# Patient Record
Sex: FEMALE | Race: BLACK OR AFRICAN AMERICAN | NOT HISPANIC OR LATINO | Employment: FULL TIME | URBAN - METROPOLITAN AREA
[De-identification: names, ages, dates, MRNs, and addresses within clinical notes are randomized per-mention and may not be internally consistent; named-entity substitution may affect disease eponyms.]

---

## 2020-10-20 ENCOUNTER — APPOINTMENT (OUTPATIENT)
Dept: GENERAL RADIOLOGY | Facility: CLINIC | Age: 25
End: 2020-10-20
Attending: EMERGENCY MEDICINE
Payer: OTHER MISCELLANEOUS

## 2020-10-20 ENCOUNTER — HOSPITAL ENCOUNTER (EMERGENCY)
Facility: CLINIC | Age: 25
Discharge: HOME | End: 2020-10-20
Attending: EMERGENCY MEDICINE | Admitting: EMERGENCY MEDICINE
Payer: OTHER MISCELLANEOUS

## 2020-10-20 VITALS
TEMPERATURE: 97.9 F | WEIGHT: 286 LBS | RESPIRATION RATE: 14 BRPM | SYSTOLIC BLOOD PRESSURE: 130 MMHG | BODY MASS INDEX: 47.65 KG/M2 | DIASTOLIC BLOOD PRESSURE: 80 MMHG | HEART RATE: 86 BPM | OXYGEN SATURATION: 100 % | HEIGHT: 65 IN

## 2020-10-20 DIAGNOSIS — S63.501A SPRAIN OF RIGHT WRIST, INITIAL ENCOUNTER: ICD-10-CM

## 2020-10-20 PROCEDURE — 250N000013 HC RX MED GY IP 250 OP 250 PS 637: Performed by: EMERGENCY MEDICINE

## 2020-10-20 PROCEDURE — 99283 EMERGENCY DEPT VISIT LOW MDM: CPT

## 2020-10-20 PROCEDURE — 73110 X-RAY EXAM OF WRIST: CPT | Mod: RT

## 2020-10-20 RX ORDER — IBUPROFEN 800 MG/1
800 TABLET, FILM COATED ORAL ONCE
Status: COMPLETED | OUTPATIENT
Start: 2020-10-20 | End: 2020-10-20

## 2020-10-20 RX ADMIN — IBUPROFEN 800 MG: 800 TABLET ORAL at 09:12

## 2020-10-20 ASSESSMENT — ENCOUNTER SYMPTOMS
FEVER: 0
DYSURIA: 0
FREQUENCY: 0
VOMITING: 0
ABDOMINAL PAIN: 0
COUGH: 0
ARTHRALGIAS: 1
CHILLS: 0
DIARRHEA: 0
NAUSEA: 0
NUMBNESS: 1

## 2020-10-20 ASSESSMENT — MIFFLIN-ST. JEOR: SCORE: 2033.17

## 2020-10-20 NOTE — ED TRIAGE NOTES
Patient comes in for evaluation of pain in right wrist. 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 ABCs intact.

## 2020-10-20 NOTE — ED AVS SNAPSHOT
[facility] Emergency Dept  [address]  [facility] [address]  Phone: [phone]  Fax: [phone]                                    Maria Alejandra Winters   MRN: [id number]    Department: [facility] Emergency Dept   Date of Visit: 10/20/2020           After Visit Summary Signature Page    I have received my discharge instructions, and my questions have been answered. I have discussed any challenges I see with this plan with the nurse or doctor.    ..........................................................................................................................................  Patient/Patient Representative Signature      ..........................................................................................................................................  Patient Representative Print Name and Relationship to Patient    ..................................................               ................................................  Date                                   Time    ..........................................................................................................................................  Reviewed by Signature/Title    ...................................................              ..............................................  Date                                               Time          22EPIC Rev 08/18

## 2020-10-20 NOTE — LETTER
October 20, 2020      To Whom It May Concern:      Maria Alejandra Winters was seen in our Emergency Department today, 10/20/20.  I expect her condition to improve over the next 7 days.  She may return to work on October 21 but will have limited use of her right hand/arm and no heavy lifting greater than 15 pounds for 7 days.    Sincerely,        Link Chatman MD

## 2020-10-20 NOTE — ED PROVIDER NOTES
"  History     Chief Complaint:  Wrist Pain    HPI   Maria Alejandra Winters is a left handed 25 year old female who presents with wrist pain. The patient reports this morning around 0620 while at work, a patient got physical and twisted her right wrist. She had immediate onset of pain. The pain is constant, aggravated by movement and has not taken anything for her pain. She presents with right handed numbness and pain that radiates from the base of the wrist into the thumb. She denies any other complaints.     Allergies:  No known drug allergies      Medications:    The patient is not currently taking any prescribed medications.     Past Medical History:    The patient does not have any past pertinent medical history.     Past Surgical History:    History reviewed. No pertinent surgical history.     Family History:    History reviewed. No pertinent family history.      Social History:  This patient is brought into the clinic by her mother.      Review of Systems   Constitutional: Negative for chills and fever.   Respiratory: Negative for cough.    Cardiovascular: Negative for chest pain.   Gastrointestinal: Negative for abdominal pain, diarrhea, nausea and vomiting.   Genitourinary: Negative for dysuria, frequency and urgency.   Musculoskeletal: Positive for arthralgias.   Neurological: Positive for numbness.   All other systems reviewed and are negative.    Physical Exam     Patient Vitals for the past 24 hrs:   BP Temp Pulse Resp SpO2 Height Weight   10/20/20 0846 125/81 97.9  F (36.6  C) 98 14 98 % 1.651 m (5' 5\") 129.7 kg (286 lb)     Physical Exam     General:   Pleasant, age appropriate.  EYES:   Conjunctiva normal.  NECK:    Supple, no meningismus.   CV:     Regular rate and rhythm     No murmurs, rubs or gallops.       2+ radial pulses bilateral.  PULM:    Clear to auscultation bilateral.       No respiratory distress.      No wheezing, rales or stridor.  MSK:     Right upper extremity:      No pain with palpation " or ROM of the elbow.      No scaphoid tenderness.      Tenderness to distal radius and ulna.      No overlying laceration or tenting of the skin.  LYMPH:   No cervical lymphadenopathy.  NEURO:   Right upper extremity:      Median, radial and ulnar nerve intact to motor and sensation.  SKIN:    Warm, dry and intact.       No rash.  PSYCH:    Mood is good and affect is appropriate.    Emergency Department Course     Imaging:  Radiology findings were communicated with the patient who voiced understanding of the findings.    XR Wrist, 3 views right  Negative exam.  Reading per radiology    Interventions:  912 ibuprofen 800 mg PO    Emergency Department Course:  Past medical records, nursing notes, and vitals reviewed.    854 I performed an exam of the patient as documented above.     35 The patient was sent for a XR right wrist while in the emergency department, results above.     0943 I rechecked the patient and discussed the results of the ED workup thus far.     Findings and plan explained to the Patient. Patient discharged home with instructions regarding supportive care, medications, and reasons to return. The importance of close follow-up was reviewed.     Impression & Plan       Medical Decision Makin-year-old female presented to the ED with traumatic right wrist pain.  X-rays are without fracture or dislocation.  No tenderness over the scaphoid bone to draw concern for occult scaphoid fracture.  Evaluation is consistent with ligamentous sprain vs contusion.  Patient to use ibuprofen and Tylenol as needed.  Velcro cock-up wrist splint as needed for comfort.  Work note provided.  Follow-up with PCP in 1 week if symptoms not improving.    Diagnosis:    ICD-10-CM   1. Sprain of right wrist, initial encounter  S63.501A     Disposition:  Discharged to home.    Scribe Disclosure:  Osiris PAYTON, am serving as a scribe at 8:57 AM on 10/20/2020 to document services personally performed by Link Chatman  MD PRASANNA based on my observations and the provider's statements to me.       Link Chatman MD  10/20/20 0929

## 2021-02-23 ENCOUNTER — HOSPITAL ENCOUNTER (EMERGENCY)
Facility: CLINIC | Age: 26
Discharge: HOME | End: 2021-02-23
Attending: EMERGENCY MEDICINE | Admitting: EMERGENCY MEDICINE
Payer: MEDICAID

## 2021-02-23 VITALS
RESPIRATION RATE: 18 BRPM | HEART RATE: 101 BPM | SYSTOLIC BLOOD PRESSURE: 135 MMHG | OXYGEN SATURATION: 100 % | BODY MASS INDEX: 47.48 KG/M2 | TEMPERATURE: 98.2 F | WEIGHT: 285 LBS | HEIGHT: 65 IN | DIASTOLIC BLOOD PRESSURE: 91 MMHG

## 2021-02-23 DIAGNOSIS — J02.9 ACUTE PHARYNGITIS, UNSPECIFIED ETIOLOGY: ICD-10-CM

## 2021-02-23 LAB
DEPRECATED S PYO AG THROAT QL EIA: NEGATIVE
FLUAV RNA RESP QL NAA+PROBE: NEGATIVE
FLUBV RNA RESP QL NAA+PROBE: NEGATIVE
LABORATORY COMMENT REPORT: NORMAL
RSV RNA SPEC QL NAA+PROBE: NORMAL
SARS-COV-2 RNA RESP QL NAA+PROBE: NEGATIVE
SPECIMEN SOURCE: NORMAL
SPECIMEN SOURCE: NORMAL

## 2021-02-23 PROCEDURE — 999N001174 HC STATISTIC STREP A RAPID: Performed by: EMERGENCY MEDICINE

## 2021-02-23 PROCEDURE — 250N000013 HC RX MED GY IP 250 OP 250 PS 637: Performed by: EMERGENCY MEDICINE

## 2021-02-23 PROCEDURE — 87636 SARSCOV2 & INF A&B AMP PRB: CPT | Performed by: EMERGENCY MEDICINE

## 2021-02-23 PROCEDURE — 99283 EMERGENCY DEPT VISIT LOW MDM: CPT

## 2021-02-23 PROCEDURE — 87651 STREP A DNA AMP PROBE: CPT | Performed by: EMERGENCY MEDICINE

## 2021-02-23 PROCEDURE — 250N000012 HC RX MED GY IP 250 OP 636 PS 637: Performed by: EMERGENCY MEDICINE

## 2021-02-23 PROCEDURE — C9803 HOPD COVID-19 SPEC COLLECT: HCPCS

## 2021-02-23 RX ORDER — IBUPROFEN 600 MG/1
600 TABLET, FILM COATED ORAL ONCE
Status: COMPLETED | OUTPATIENT
Start: 2021-02-23 | End: 2021-02-23

## 2021-02-23 RX ADMIN — DEXAMETHASONE 10 MG: 2 TABLET ORAL at 21:31

## 2021-02-23 RX ADMIN — IBUPROFEN 600 MG: 600 TABLET, FILM COATED ORAL at 21:30

## 2021-02-23 ASSESSMENT — MIFFLIN-ST. JEOR: SCORE: 2028.63

## 2021-02-23 ASSESSMENT — ENCOUNTER SYMPTOMS
TROUBLE SWALLOWING: 1
COUGH: 1
GASTROINTESTINAL NEGATIVE: 1
VOMITING: 0
SORE THROAT: 1
CONSTITUTIONAL NEGATIVE: 1
DIARRHEA: 0

## 2021-02-24 LAB
SPECIMEN SOURCE: NORMAL
STREP GROUP A PCR: NOT DETECTED

## 2021-02-24 NOTE — ED TRIAGE NOTES
Here for sore throat with pain radiating to right side of face. Feels like throat is swelling and having trouble/pain with swallowing. Coughing for few weeks. Had negative covid about 1 week ago. Took oxycodone about 30 min prior to arrival. ABCs intact.

## 2021-02-24 NOTE — RESULT ENCOUNTER NOTE
Group A Streptococcus PCR is NEGATIVE  No treatment or change in treatment [facility] ED lab result protocol - Strep protocol.

## 2021-02-24 NOTE — ED PROVIDER NOTES
"  History   Chief Complaint:  Pharyngitis       HPI   Maria Alejandra Winters is a 25 year old female with history of asthma who presents with sore throat starting this morning more on the right side and radiating into her right ear.  Took 1 tablet of oxycodone she had leftover from a previous prescription but this did not help with her pain so she presented the emergency department due to concerns for strep throat.  She denies fever or chills.  She notes that since taking the oxycodone she feels more able to swallow without difficulty.  She notes she has has a chronic cough that is not new for her and smokes marijuana but not cigarettes.  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  She denies any abdominal pain, nausea or vomiting or diarrhea.    Review of Systems   Constitutional: Negative.    HENT: Positive for ear pain, sore throat and trouble swallowing (improved since pain meds).    Respiratory: Positive for cough (chronic).    Cardiovascular: Negative for chest pain.   Gastrointestinal: Negative.  Negative for diarrhea and vomiting.   All other systems reviewed and are negative.        Allergies:  The patient has no known allergies.     Medications:  Oxycodone    Albuterol     Past Medical History:    Asthma      Past Surgical History:    Bronchoscopy with lavage     Family History:    Hypertension- father     Social History:  Smokes marijuana.  Denies cigarette smoking.  Denies regular alcohol use.  Here alone.    Physical Exam     Patient Vitals for the past 24 hrs:   BP Temp Temp src Pulse Resp SpO2 Height Weight   02/23/21 2109 (!) 135/91 98.2  F (36.8  C) Oral 101 18 100 % 1.651 m (5' 5\") 129.3 kg (285 lb)       Physical Exam  Gen: Not female sitting upright  Eyes: PERRL, no scleral icterus, conjunctiva normal  ENT: Moist mucous membranes. Posterior oropharynx erythematous but is without exudate, lesions or significant edema.  No pooling of secretions.Uvula is midline. No asymmetry.  Speech is " normal.  Neck: No lymphadenopathy  CV: Normal S1S2. Regular rate and rhythm. No murmurs, rubs or gallops.  Resp: Clear to auscultation bilaterally. Normal respiratory effort. No wheezes, rales or rhonchi.  GI: Abdomen is soft, nontender and nondistended. No hepatosplenolegaly or palpable masses.   MSK: No edema. Nontender. Normal active range of motion. Ambulatory.  Skin: Warm and dry. No rashes, petechiae or ecchymoses.  Neuro: Alert and appropriate. Normal speech. Responds appropriately to all questions and commands. No focal abnormalities appreciated.  Psych: Normal affect. Pleasant.     Emergency Department Course     Laboratory:  Rapid Strep Test: negative   Strep Culture: Pending    Influenza A/B antigen: negative     COVID-19 by PCR: negative     Emergency Department Course:    Reviewed:  I reviewed nursing notes, vitals, past medical history and care everywhere    Assessments:  2113 I obtained history and examined the patient as noted above.   2205 I rechecked the patient and explained findings prior to discharge.  She is feeling improved.    Consults:   NA    Interventions:  2130 Advil 600 mg oral   2131 Decadron 10 mg oral     Disposition:  The patient was discharged to home.     Impression & Plan       CMS Diagnoses: None    Medical Decision Making:  Maria Alejandra Winters is a 25 year old female who presents with sore throat with negative rapid strep test.  DDX includes: viral pharyngitis, group A streptococci, infectious mononucleosis, gonorrhea, influenza, COVID-19, peritonsillar abscess, retropharyngeal abscess, epiglottitis.  In this patient who is generally non-toxic, strep negative with no intra-oral lesions, no uvular swelling, no addison-tonsillar or retropharyngeal swelling, no drooling, no signs of candidiasis, no stridor, or problems controlling their secretions the most likely diagnosis is viral pharyngitis and symptomatic relief is required.  I discussed in detail the possibility that there could be a  more dangerous infection and that follow-up was required if symptoms persist.  She understands to follow-up within 3 to 5 days with ongoing symptoms.  Return to the emergency department for worsening symptoms.  All questions were answered prior to discharge.     Covid-19  Maria Alejandra Winters was evaluated during a global COVID-19 pandemic, which necessitated consideration that the patient might be at risk for infection with the SARS-CoV-2 virus that causes COVID-19.   Applicable protocols for evaluation were followed during the patient's care.   COVID-19 was considered as part of the patient's evaluation. The plan for testing is:  a test was obtained during this visit.    Diagnosis:    ICD-10-CM    1. Acute pharyngitis, unspecified etiology  J02.9 Symptomatic Influenza A/B & SARS-CoV2 (COVID-19) Virus PCR Multiplex     Scribe Disclosure:  I, Yvonne Amos, am serving as a scribe at 9:25 PM on 2/23/2021 to document services personally performed by Aleksandra Garcia MD based on my observations and the provider's statements to me.              Aleksandra Garcia MD  02/23/21 0805

## 2021-07-02 ENCOUNTER — HOSPITAL ENCOUNTER (EMERGENCY)
Facility: CLINIC | Age: 26
Discharge: HOME | End: 2021-07-02
Attending: PHYSICIAN ASSISTANT | Admitting: PHYSICIAN ASSISTANT
Payer: MEDICAID

## 2021-07-02 VITALS
RESPIRATION RATE: 15 BRPM | OXYGEN SATURATION: 100 % | DIASTOLIC BLOOD PRESSURE: 92 MMHG | BODY MASS INDEX: 49.67 KG/M2 | TEMPERATURE: 98.4 F | SYSTOLIC BLOOD PRESSURE: 172 MMHG | WEIGHT: 293 LBS | HEART RATE: 106 BPM

## 2021-07-02 DIAGNOSIS — L08.9 SKIN INFECTION: ICD-10-CM

## 2021-07-02 PROCEDURE — 99282 EMERGENCY DEPT VISIT SF MDM: CPT

## 2021-07-02 RX ORDER — SULFAMETHOXAZOLE/TRIMETHOPRIM 800-160 MG
1 TABLET ORAL 2 TIMES DAILY
Qty: 14 TABLET | Refills: 0 | Status: SHIPPED | OUTPATIENT
Start: 2021-07-02 | End: 2021-07-09

## 2021-07-02 ASSESSMENT — ENCOUNTER SYMPTOMS
FEVER: 0
CHILLS: 0

## 2021-07-02 NOTE — ED TRIAGE NOTES
Pt describes having a lump on her back that is getting larger and increasingly painful.  First noticed the lump three days ago.  No drainage.

## 2021-07-02 NOTE — LETTER
July 2, 2021      To Whom It May Concern:      Maria Alejandra Winters was seen in our Emergency Department today, 07/02/21. Please excuse her from work 7/2/21-7/3/21. She may return to work 7/4/21.     Sincerely,        Yuliet Burrell PA-C

## 2021-07-02 NOTE — ED PROVIDER NOTES
History   Chief Complaint:  Wound Check       The history is provided by the patient.      Maria Alejandra Winters is a 26 year old female who presents with a painful bump on the right side of her back lateral to the midline. She reports that it is painful and tender. She noticed it starting a couple days ago. She reports pain is exacerbated with palpation and becomes more irritated when lifting as her shirt rubs against it.  The patient denies fever and chills.  Notes that she had a pimple to the area prior to symptom onset.  At the time of the exam, the patient denied chest pain, shortness of breath, nausea, vomiting, diarrhea, abdominal pain, back pain, numbness or tingling in her extremities, or any other medical concerns.    Review of Systems   Constitutional: Negative for chills and fever.   Skin:        (+) painful bump on back   All other systems reviewed and are negative.    Allergies:  No Known Drug Allergies    Medications:  Oxycodone  Zofran  Zithromax  abuterol inhaler  Pepcid  Provera  Tessalon    Past Medical History:    Gross hemauria     Social History:  Presents to the ED accompanied by her sister.   Employed.     Physical Exam     Patient Vitals for the past 24 hrs:   BP Temp Temp src Pulse Resp SpO2 Weight   07/02/21 0939 (!) 160/97 98.4  F (36.9  C) Oral 106 16 100 % 135.4 kg (298 lb 8.1 oz)       Physical Exam  Vitals signs and nursing note reviewed.   HENT:      Nose: Nose normal. No congestion or rhinorrhea.      Mouth/Throat:      Mouth: Mucous membranes are moist.      Pharynx: Oropharynx is clear. No oropharyngeal exudate or posterior oropharyngeal erythema.   Eyes:      General: No scleral icterus.     Extraocular Movements: Extraocular movements intact.   Cardiovascular:      Rate and Rhythm: Regular rhythm. Normal Rate.     Pulses: Normal pulses.      Heart sounds: Normal heart sounds.   Pulmonary:      Effort: Pulmonary effort is normal.      Breath sounds: Normal breath sounds.   Abdominal:       General: Abdomen is flat. Bowel sounds are normal.      Palpations: Abdomen is soft.      Tenderness: There is no abdominal tenderness.   Musculoskeletal: Normal range of motion lateral upper and lower extremities.  Patient able to flex and extend back without difficulty.  No normal range of motion of neck.  No cervical, thoracic, or lumbar midline bony tenderness.  Skin:     General: Skin is warm and dry.  Palpable quarter-sized indurated area to right side of back with what appears to be a previously popped pimple in the center.  No fluctuance or area amendable to incision and drainage.  Mild warmth surrounding the area.   Neurological:      Mental Status: Alert. Speech normal. Responds appropriately to questions.   Psychiatric:         Mood and Affect: Mood normal.         Behavior: Behavior normal.       Emergency Department Course     Emergency Department Course:    Reviewed:  I reviewed nursing notes, vitals, past medical history and care everywhere    Assessments:  1006 I obtained history and examined the patient as noted above.  We discussed the plan for discharge home with antibiotic therapy and close follow-up with her primary care provider for wound recheck.  Patient was discharged home in stable condition.    Disposition:  The patient was discharged to home.       Impression & Plan   Medical Decision Making:   Maria Alejandra Winters on 6-year-old female who presents to the emergency department accompanied by her sister for the evaluation of a painful bump to the right side of her back lateral to the midline.  Vitals are reviewed.  Patient afebrile and hemodynamically stable.  On physical exam, there was a palpable quarter-sized indurated area to right side of back with what appears to be a previously popped pimple in the center.  No fluctuance or area amendable to incision and drainage.  Mild warmth surrounding the area concerning for skin infection with possibly early abscess formation.  At this time, there  is no evidence to suggest complications including but not limited to sepsis, shock, lymphangitis, lymphadenitis, abscess, etc. Labs discussed, patient deferred.  The patient is not immunosuppressed or diabetic.  Given clinical findings, I elected to discharge the patient home with a course of antibiotic therapy to complete in the outpatient setting.  She was advised to follow-up with her primary care provider in 2 to 3 days for wound recheck. Area of involvement was outlined prior to discharge.  Strict return precautions were discussed including but not limited to high fevers, spreading redness, increased pain, inability to tolerate oral antibiotics, or any other medical concerns.  All questions and concerns were addressed prior to discharge.  The patient understands and agrees to this plan.      Diagnosis:    ICD-10-CM    1. Skin infection  L08.9        Discharge Medications:  New Prescriptions    SULFAMETHOXAZOLE-TRIMETHOPRIM (BACTRIM DS) 800-160 MG TABLET    Take 1 tablet by mouth 2 times daily for 7 days       Scribe Disclosure:  IAyanna, am serving as a scribe at 10:01 AM on 7/2/2021 to document services personally performed by Yuliet Burrell PA-C based on my observations and the provider's statements to me.          Yuliet Burrell PA-C  07/02/21 1038       Yuliet Burrell PA-C  07/02/21 1039       Yuliet Burrell PA-C  07/02/21 1141

## 2021-07-02 NOTE — DISCHARGE INSTRUCTIONS
As discussed, I suspect her symptoms are secondary to a skin infection with possible early abscess formation.  Please begin antibiotic therapy today and complete the full duration.  Follow-up with your primary care provider in 2 to 3 days for wound check.  Return to the emergency department if you develop fever, increased size of the involved area, or any other medical concerns.

## 2021-10-25 ENCOUNTER — APPOINTMENT (OUTPATIENT)
Dept: GENERAL RADIOLOGY | Facility: CLINIC | Age: 26
End: 2021-10-25
Attending: PHYSICIAN ASSISTANT
Payer: MEDICAID

## 2021-10-25 ENCOUNTER — HOSPITAL ENCOUNTER (EMERGENCY)
Facility: CLINIC | Age: 26
Discharge: HOME | End: 2021-10-25
Attending: PHYSICIAN ASSISTANT | Admitting: PHYSICIAN ASSISTANT
Payer: MEDICAID

## 2021-10-25 VITALS
SYSTOLIC BLOOD PRESSURE: 154 MMHG | RESPIRATION RATE: 18 BRPM | HEART RATE: 96 BPM | OXYGEN SATURATION: 100 % | DIASTOLIC BLOOD PRESSURE: 114 MMHG | TEMPERATURE: 98.2 F

## 2021-10-25 DIAGNOSIS — M79.2 RADICULAR PAIN IN LEFT ARM: ICD-10-CM

## 2021-10-25 DIAGNOSIS — M25.512 ACUTE PAIN OF LEFT SHOULDER: ICD-10-CM

## 2021-10-25 PROCEDURE — 99284 EMERGENCY DEPT VISIT MOD MDM: CPT

## 2021-10-25 PROCEDURE — 73030 X-RAY EXAM OF SHOULDER: CPT | Mod: LT

## 2021-10-25 RX ORDER — LIDOCAINE 50 MG/G
1 PATCH TOPICAL EVERY 24 HOURS
Qty: 10 PATCH | Refills: 0 | Status: SHIPPED | OUTPATIENT
Start: 2021-10-25 | End: 2021-11-04

## 2021-10-25 RX ORDER — METHOCARBAMOL 750 MG/1
750 TABLET, FILM COATED ORAL 3 TIMES DAILY PRN
Qty: 15 TABLET | Refills: 0 | Status: SHIPPED | OUTPATIENT
Start: 2021-10-25 | End: 2021-10-30

## 2021-10-25 RX ORDER — METHYLPREDNISOLONE 4 MG
TABLET, DOSE PACK ORAL
Qty: 21 TABLET | Refills: 0 | Status: SHIPPED | OUTPATIENT
Start: 2021-10-25 | End: 2022-12-05

## 2021-10-25 ASSESSMENT — ENCOUNTER SYMPTOMS
WEAKNESS: 1
NUMBNESS: 1
NECK PAIN: 0

## 2021-10-25 NOTE — LETTER
October 25, 2021      To Whom It May Concern:      Maria Alejandra Winters was seen in our Emergency Department today, 10/25/21.  I expect her condition to improve over the next 1-2 days.  She may return to work/school when improved.    Sincerely,        Alee Triana PA-C

## 2021-10-25 NOTE — ED PROVIDER NOTES
"  History   Chief Complaint:  Arm Pain     The history is provided by the patient.      Maria Alejandra Winters is a 26 year old female who presents with arm pain. 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 Since then, she is still feeling the numbness that she describes as an \"arm heaviness\" but is also having pain from certain movements in her arm, tingling in her fingers and occasional weakness to the point where she is dropping her phone out of her hand. The patient denies neck pain, chest pain or history of a blood clot.     To note, the patient did not feel a pull or pop when she was rolling the patient.     Review of Systems   Cardiovascular: Negative for chest pain.   Musculoskeletal: Negative for neck pain.   Neurological: Positive for weakness (Hand) and numbness (And tingling ).   All other systems reviewed and are negative.    Allergies:  The patient has no known allergies.     Medications:  Zofran  Zithromax  Abuterol inhaler  Pepcid  Provera  Tessalon    Past Medical History:     Gross hematuria  Ashma      Past Surgical History:    The patient denies past surgical history.      Family History:    The patient denies past family history.     Social History:  The patient presents to the ED alone   He patient works in Health Care    Physical Exam     Patient Vitals for the past 24 hrs:   BP Temp Pulse Resp SpO2   10/25/21 0935 (!) 154/114 98.2  F (36.8  C) 96 18 100 %     Physical Exam  General: Alert and interactive. Appears well.   Head: Atraumatic, without obvious lesion, abrasion, hematoma.   Eyes: The pupils are equal and round. No scleral icterus.   ENT: No obvious abnormalities to the ears or nose. Mucous membranes moist.   Neck:Trachea is in the midline. No obvious swelling to the neck. Full range of motion.   CV: Regular rate. Extremities well perfused. Capillary refill brisk in fingers. Radial " "pulse is normal.   Resp: Non-labored, no retractions or accessory muscle use.     GI: Abdomen is not distended.   MS: Moving all extremities well. There is focal pinpoint tenderness to the left posterior shoulder, just lateral to the spine of the scapula. When palpating this, the patient has reproducible \"heaviness\" in the arm and shooting pains into the elbow. Otherwise, her strength is preserved. She describes pain int he posterior shoulder with resisted flexion, but bicep/tricep strength, deltoid strength, and hand grasp are maintained. Full sensation in fingers.  Patient can perform okay sign, peace sign, and cross fingers.  She can flex and extend at the wrist without any difficulty.  Skin: No rash.   Neuro: Alert and oriented x 3. Non-focal examination.    Psych: Awake. Alert.  Normal affect. Appropriate interactions.    Emergency Department Course     Imaging:    XR Shoulder Left G/E 3 Views   Final Result   IMPRESSION: Possible small subchondral cyst in the distal head of the   clavicle. Otherwise unremarkable shoulder radiographs.      JEFF CARTER MD            SYSTEM ID:  [id number]        Report per radiology    Emergency Department Course:  Reviewed:  I reviewed nursing notes, vitals, past medical history, Care Everywhere and [facility]    Assessments:  1103 I obtained history and examined the patient as noted above.   1208 I rechecked the patient and explained findings.     Disposition:  The patient was discharged to home.     Impression & Plan     Medical Decision Making:  Maria Alejandra Winters is a 26 year old female who presents for evaluation of left arm pain and heaviness after moving a patient at her workplace a couple of days ago.  She has trouble describing her symptoms, but it sounds like the patient has radicular pain, as she occasionally has heaviness, occasionally has shooting pains, and also feels some numbness and tingling in the tips of her fingers.     On examination, the patient has full " sensation to the tips of her fingers and full strength without any focal weakness with bicep, tricep, deltoid, and hand grasp testing. There is no focal neurologic deficit. The patient has no weakness in her leg, no vision deficits, no facial weakness, speech difficulty, or balance issues. It does not seem consistent with acute CVA. Also, on examination, the patient has focal pinpoint, and reproducible tenderness in the posterior shoulder just lateral to the spine of the scapula. When palpating this particular spot, the patient has reproducible heaviness in her arm and some radicular shooting pains. X-ray of the shoulder shows a possible subchondral cyst with no signs of fracture, dislocation, or significant arthritis. The patient has no chest pain, and with reproducible pain in the arm and in the posterior shoulder, I doubt ACS.    I suspect this is all radicular pain related to nerve impingement at the level of the shoulder.  It is possible patient also has weakness in her arm muscles from a rotator cuff injury or other muscle tear from moving the patient. She has no focal weakness on examination, and I do not think emergent MRI of the cervical spine or shoulder are indicated at this time. She has no fevers, chills, chest pain, shortness of breath, weakness, or other concerning findings. I recommended that she follow closely with primary care, and in the meantime I will treat her with lidocaine patches, Robaxin, and Medrol Dosepak. Patient return here immediately for focal weakness, fevers or chills, other worrisome concerns.    Diagnosis:    ICD-10-CM    1. Acute pain of left shoulder  M25.512    2. Radicular pain in left arm  M79.2      Discharge Medications:  New Prescriptions    LIDOCAINE (LIDODERM) 5 % PATCH    Place 1 patch onto the skin every 24 hours for 10 days    METHOCARBAMOL (ROBAXIN) 750 MG TABLET    Take 1 tablet (750 mg) by mouth 3 times daily as needed for muscle spasms    METHYLPREDNISOLONE  (MEDROL DOSEPAK) 4 MG TABLET THERAPY PACK    Follow Package Directions     Scribe Disclosure:  I, Tomym Angulo, am serving as a scribe at 10:48 AM on 10/25/2021 to document services personally performed by Alee Triana PA-C, based on my observations and the provider's statements to me.            Alee Triana PA-C  10/25/21 4999

## 2021-10-25 NOTE — DISCHARGE INSTRUCTIONS
Try Lidocaine patches over the shoulder.   Use Robaxin for muscle relaxant.   Try Medrol Dosepak.   Rest as much as possible and ice regularly.   See primary care for follow up.

## 2021-10-25 NOTE — ED TRIAGE NOTES
Patient presents to the ED with left arm pain and tingling. 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

## 2022-01-17 ENCOUNTER — HOSPITAL ENCOUNTER (EMERGENCY)
Facility: CLINIC | Age: 27
Discharge: HOME | End: 2022-01-17
Attending: EMERGENCY MEDICINE | Admitting: EMERGENCY MEDICINE
Payer: MEDICAID

## 2022-01-17 VITALS
OXYGEN SATURATION: 100 % | DIASTOLIC BLOOD PRESSURE: 103 MMHG | TEMPERATURE: 100.4 F | HEART RATE: 106 BPM | SYSTOLIC BLOOD PRESSURE: 139 MMHG | RESPIRATION RATE: 20 BRPM

## 2022-01-17 DIAGNOSIS — K52.9 GASTROENTERITIS: ICD-10-CM

## 2022-01-17 LAB
FLUAV RNA SPEC QL NAA+PROBE: NEGATIVE
FLUBV RNA RESP QL NAA+PROBE: NEGATIVE
SARS-COV-2 RNA RESP QL NAA+PROBE: NEGATIVE

## 2022-01-17 PROCEDURE — 99283 EMERGENCY DEPT VISIT LOW MDM: CPT

## 2022-01-17 PROCEDURE — C9803 HOPD COVID-19 SPEC COLLECT: HCPCS

## 2022-01-17 PROCEDURE — 87636 SARSCOV2 & INF A&B AMP PRB: CPT | Performed by: EMERGENCY MEDICINE

## 2022-01-17 ASSESSMENT — ENCOUNTER SYMPTOMS
BLOOD IN STOOL: 0
CHILLS: 1
RESPIRATORY NEGATIVE: 1
VOMITING: 1
DIARRHEA: 1
NAUSEA: 1
FEVER: 1

## 2022-01-17 NOTE — ED PROVIDER NOTES
"  History     Chief Complaint:  Diarrhea, Abdominal Pain, and Headache      HPI   Maria Alejandra Winters is a 26 year old female who presents with complaints of acute nausea with an isolated episode of vomiting yesterday, diarrhea, fever and headache.  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  The nausea is resolved and she is tolerating p.o.    Review of Systems   Constitutional: Positive for chills and fever.   Respiratory: Negative.    Gastrointestinal: Positive for diarrhea, nausea and vomiting. Negative for blood in stool.   All other systems reviewed and are negative.    Allergies:      No Known Allergies      Medications:      methylPREDNISolone (MEDROL DOSEPAK) 4 MG tablet therapy pack        Past Medical History:    asthma        Social History:  Employed as CNA    Physical Exam     No data found.    Physical Exam  General: Patient is alert and cooperative.  HENT:  Normal appearance.   Eyes: EOMI. Normal conjunctiva.  Neck:  Normal range of motion and appearance.   Cardiovascular:  Normal rate.   Pulmonary/Chest:  Effort normal.  Abdominal: Soft. No distension or tenderness.     Musculoskeletal: Normal range of motion. No edema or tenderness.   Neurological: oriented, normal strength, sensation, and coordination.   Skin: Warm and dry. No rash or bruising.   Psychiatric: Normal mood and affect. Normal behavior and judgement.      Emergency Department Course     Laboratory:  Labs Ordered and Resulted from Time of ED Arrival to Time of ED Departure   INFLUENZA A/B & SARS-COV2 PCR MULTIPLEX - Normal       Result Value    Influenza A PCR Negative      Influenza B PCR Negative      SARS CoV2 PCR Negative       Reviewed:    I reviewed nursing notes, vitals and past history    Assessments:   I obtained history and examined the patient as noted above.    I rechecked the patient and explained findings.     Disposition:  The patient was discharged to " home.    Impression & Plan    Medical Decision Making:  Maria Alejandra Winters is a 26 year old female who presents with acute nausea, vomiting and diarrhea. She has a benign abdominal exam without focal RLQ or LLQ tenderness to suggest diverticulitis or appendicitis, respectively, or other acute abdominal process. I did discuss the sometimes vague initial constellation of symptoms early in the course of acute abdominal processes, and the need for immediate return should pain or other concerning symptoms develop.  Symptoms are improved after IV fluids and symptomatic treatment.  There has been no travel or antibiotic exposure to suggest more concerning cause of diarrhea, and there has been no hematemesis or BRBPR/melena.  Vital signs have remained normal and stable throughout the ED course, and the abdominal re-exam remains benign. I believe she is safe for discharge in improved condition at this time with strict return precautions for recurrent vomiting, pain, fever or any other concerning symptoms.    covid neg.      Covid-19  Maria Alejandra Winters was evaluated during a global COVID-19 pandemic, which necessitated consideration that the patient might be at risk for infection with the SARS-CoV-2 virus that causes COVID-19.   Applicable protocols for evaluation were followed during the patient's care.   COVID-19 was considered as part of the patient's evaluation. The plan for testing is:  a test was obtained during this visit.    Diagnosis:    ICD-10-CM    1. Gastroenteritis  K52.9                 Deonte Parmar MD  01/18/22 1542

## 2022-01-17 NOTE — ED TRIAGE NOTES
Arrives with 3 days of diarrhea, headache, and generalized body aches, exposed to COVID at work, alert and oriented, ABCs intact.

## 2022-01-17 NOTE — Clinical Note
Maria Alejandra Winters was seen and treated in our emergency department on 1/17/2022.  She may return to work on 01/21/2022.       If you have any questions or concerns, please don't hesitate to call.      Deonte Parmar MD

## 2022-01-17 NOTE — DISCHARGE INSTRUCTIONS
Discharge Instructions  Gastroenteritis    You have been seen today for vomiting (throwing up) and diarrhea (loose stools), called gastroenteritis or the stomach flu. This is usually caused by a virus, but some bacteria, parasites, medicines or other medical conditions can cause similar symptoms. At this time your provider does not find that your vomiting and diarrhea is a sign of anything dangerous or life-threatening.  However, sometimes the signs of serious illness do not show up right away. Remember that serious problems like appendicitis can look like gastroenteritis at first.       Generally, every Emergency Department visit should have a follow-up clinic visit with either a primary or a specialty clinic/provider. Please follow-up as instructed by your emergency provider today.    Return to the Emergency Department if:  You keep vomiting and you are not able to keep liquids down.  You feel you are getting dehydrated, such as being very thirsty, not urinating (peeing), or feeling faint or lightheaded.   You develop a new fever.  You have abdominal (belly) pain that seems worse than cramps, is in one spot, or is getting worse over time.   You have blood in your vomit or in your diarrhea.  You feel very weak.    What can I do to help myself?  The most important thing to do is to drink clear liquids.  If you have been vomiting a lot, it is best to have only small, frequent sips of liquids.  Drinking too much at once may cause more vomiting. Water is a good first option for rehydration. If you are vomiting often, you must also replace electrolytes (salts and minerals) lost with your illness. Pedialyte  is the best rehydration liquid but many don t like the taste so sports drinks (like Gatorade ) are a good option. Sodas and juice are also options but are high in sugar. Avoid acid liquids (orange), caffeine (coffee) or alcohol. Do not drink milk until you no longer have diarrhea.  After liquids are staying down, you  may start eating mild foods. Soda crackers, toast, plain noodles, gelatin, applesauce and bananas are good first choices.  Avoid foods that have acid, are spicy, fatty or fibrous (such as meats, coarse grains, vegetables). You may start eating these foods again in about 3 days when you are better.  Sometimes treatment includes prescription medicine to prevent nausea (sick to your stomach) and vomiting and to prevent diarrhea. If your provider prescribes these for you, take them as directed.  Nonprescription medicine is available for the treatment of diarrhea and can be very effective.  If you use it, make sure you use the dose recommended on the package. Avoid Lomotil . Check with your healthcare provider before you use any medicine for diarrhea.  Do not take ibuprofen, or other nonsteroidal anti-inflammatory medicines without checking with your healthcare provider.  If you were given a prescription for medicine here today, be sure to read all of the information (including the package insert) that comes with your prescription.  This will include important information about the medicine, its side effects, and any warnings that you need to know about.  The pharmacist who fills the prescription can provide more information and answer questions you may have about the medicine.  If you have questions or concerns that the pharmacist cannot address, please call or return to the Emergency Department.   Remember that you can always come back to the Emergency Department if you are not able to see your regular provider in the amount of time listed above, if you get any new symptoms, or if there is anything that worries you.  Discharge Instructions  Fever    You have been seen today for a fever. Fever is a normal body reaction to illness or inflammation. Fever is a sign that your body is doing what it should to fight something off. Fever is not dangerous, but it can make you feel miserable, and you will probably feel better if  you get your fever to go down. Most infections are caused by a virus, and antibiotics will not help; your provider will tell you whether antibiotics are needed in your case. At this time your provider does not find that your fever is a sign of anything dangerous or life-threatening.  However, sometimes the signs of serious illness do not show up right away so additional care may be necessary.    Generally, every Emergency Department visit should have a follow-up clinic visit with either a primary or a specialty clinic/provider. Please follow-up as instructed by your emergency provider today.    What can I do to help myself?  Fill any prescriptions the provider gave you and take them right away--especially antibiotics.  If you have a fever, get plenty of rest and drink lots of fluids, especially water.  What clothes or blankets you have on will not change your fever. Do what is comfortable for you.  Bathing or sponging in lukewarm water may help you feel better.  Tylenol  (acetaminophen), Motrin  (ibuprofen), or Advil  (ibuprofen) help bring fever down and may help you feel more comfortable. Be sure to read and follow the package directions, and ask your provider if you have questions.  Do not drink alcohol.    Return to the Emergency Department if:  Any of the symptoms you have get much worse.  You seem very sick, like being too weak to get up.  You have any new symptoms, especially serious things like abdominal (belly) pain or chest pain.  You are short of breath.  You have a severe headache.  You are vomiting (throwing up) so much you cannot keep fluids or medicines down.  You have confusion or seem unusually drowsy.  You have a seizure.  You have anything else that worries you.  If you were given a prescription for medicine here today, be sure to read all of the information (including the package insert) that comes with your prescription.  This will include important information about the medicine, its side  effects, and any warnings that you need to know about.  The pharmacist who fills the prescription can provide more information and answer questions you may have about the medicine.  If you have questions or concerns that the pharmacist cannot address, please call or return to the Emergency Department.   Remember that you can always come back to the Emergency Department if you are not able to see your regular provider in the amount of time listed above, if you get any new symptoms, or if there is anything that worries you.

## 2022-03-18 ENCOUNTER — HOSPITAL ENCOUNTER (EMERGENCY)
Facility: CLINIC | Age: 27
Discharge: HOME | End: 2022-03-18
Attending: NURSE PRACTITIONER | Admitting: NURSE PRACTITIONER

## 2022-03-18 VITALS
TEMPERATURE: 98.7 F | RESPIRATION RATE: 20 BRPM | SYSTOLIC BLOOD PRESSURE: 114 MMHG | HEART RATE: 88 BPM | DIASTOLIC BLOOD PRESSURE: 78 MMHG | OXYGEN SATURATION: 100 %

## 2022-03-18 DIAGNOSIS — M62.830 BACK MUSCLE SPASM: ICD-10-CM

## 2022-03-18 DIAGNOSIS — M54.50 LOW BACK PAIN: ICD-10-CM

## 2022-03-18 PROCEDURE — 99284 EMERGENCY DEPT VISIT MOD MDM: CPT

## 2022-03-18 PROCEDURE — 96372 THER/PROPH/DIAG INJ SC/IM: CPT | Performed by: NURSE PRACTITIONER

## 2022-03-18 PROCEDURE — 250N000011 HC RX IP 250 OP 636: Performed by: NURSE PRACTITIONER

## 2022-03-18 PROCEDURE — 250N000013 HC RX MED GY IP 250 OP 250 PS 637: Performed by: NURSE PRACTITIONER

## 2022-03-18 RX ORDER — KETOROLAC TROMETHAMINE 30 MG/ML
30 INJECTION, SOLUTION INTRAMUSCULAR; INTRAVENOUS ONCE
Status: COMPLETED | OUTPATIENT
Start: 2022-03-18 | End: 2022-03-18

## 2022-03-18 RX ORDER — METHOCARBAMOL 500 MG/1
1000 TABLET, FILM COATED ORAL ONCE
Status: COMPLETED | OUTPATIENT
Start: 2022-03-18 | End: 2022-03-18

## 2022-03-18 RX ORDER — METHOCARBAMOL 500 MG/1
TABLET, FILM COATED ORAL
Qty: 24 TABLET | Refills: 0 | Status: SHIPPED | OUTPATIENT
Start: 2022-03-18 | End: 2022-12-05

## 2022-03-18 RX ORDER — LIDOCAINE 4 G/G
2 PATCH TOPICAL ONCE
Status: DISCONTINUED | OUTPATIENT
Start: 2022-03-18 | End: 2022-03-18 | Stop reason: HOSPADM

## 2022-03-18 RX ADMIN — LIDOCAINE 2 PATCH: 246 PATCH TOPICAL at 19:20

## 2022-03-18 RX ADMIN — METHOCARBAMOL 1000 MG: 500 TABLET ORAL at 19:20

## 2022-03-18 RX ADMIN — KETOROLAC TROMETHAMINE 30 MG: 30 INJECTION, SOLUTION INTRAMUSCULAR; INTRAVENOUS at 19:20

## 2022-03-18 ASSESSMENT — ENCOUNTER SYMPTOMS
BACK PAIN: 1
ROS GI COMMENTS: INCONTINENCE -
NAUSEA: 0
NUMBNESS: 0
VOMITING: 0
HEMATURIA: 0
ABDOMINAL PAIN: 0

## 2022-03-18 NOTE — LETTER
March 18, 2022      To Whom It May Concern:      Maria Alejandra Winters was seen in our Emergency Department today, 03/18/22.  I expect her condition to improve over the next 3 days.  She may return to work/school when improved.    Sincerely,        Elis BENTLEY RN

## 2022-03-18 NOTE — ED TRIAGE NOTES
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 VSS on RA.

## 2022-03-19 NOTE — DISCHARGE INSTRUCTIONS
Ibuprofen or Naproxen and/or Tylenol scheduled for the next 3-5 days. 600 mg (three tabs) ibuprofen, 440 mg (two tabs) Naproxen, 650-1000 mg of Tylenol.  Ibuprofen and Tylenol are every 6 hours Naproxen is 2 times daily. Follow directions. Use OTC lidocaine patches as directed.   Ice or heat to area.  I recommend ice in the first 24-48 hours. Apply ice for no more than 20 minutes at a time with at least an hour break in between applications.  3-5 times daily is recommended.

## 2022-03-19 NOTE — ED PROVIDER NOTES
History     Chief Complaint:  Back Pain     The history is provided by the patient.      Maria Alejandra Winters is a 26 year old female with a history of PCOS, asthma who presents with right-sided low back pain which began last night around 2300. 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 While she was at work last night, she states that she was pushing a resident away from her and trying to turn them when she feels like she may have strained a muscle in the right side of her low back. She did not experience immediate pain, and she reports that the pain began 1-2 hours after this particular moment with the resident. Today, she states that she woke up with no improvement in her pain. The pain is non-radiating and is not reproducible with palpation. She primarily experiences the pain with movement. She has tried muscle relaxers, ibuprofen, Tylenol, ice, and lidocaine patches without relief. Here in the ED, Maria Alejandra reports that her pain is a 9 out of 10. She denies recent fall, trauma, or head injury. She denies numbness or tinging to her legs or genital area, urinary or bowel incontinence, hematuria, nausea, vomiting, abdominal pain.     Review of Systems   Gastrointestinal: Negative for abdominal pain, nausea and vomiting.        Incontinence -   Genitourinary: Negative for hematuria.        Incontinence -   Musculoskeletal: Positive for back pain.   Neurological: Negative for numbness.   All other systems reviewed and are negative.    Allergies:  The patient has no known allergies.     Medications:  Albuterol inhaler  Provera    Past Medical History:     Stress incontinence  Atypical pneumonia  Myopia of both eyes with astigmatism  PCOS  Obesity  Eczema   Borderline hypertension  Varicella   Asthma      Scoliosis      Speech delay      Tinea capitis      Past Surgical History:    Bronchoscopy with lavage pulmonary      Family History:    Father:  hypertension  Mother: hypertension, cervical dysplasia    Social History:  The patient presents to the ED alone.  The patient presents to the ED via car.  The patient works as a CNA.    Physical Exam     Patient Vitals for the past 24 hrs:   BP Temp Temp src Pulse Resp SpO2   03/18/22 2105 114/78 98.7  F (37.1  C) Temporal 88 20 100 %     Physical Exam  Nursing notes reviewed. Vitals reviewed.  General: Alert.  Mild  discomfort . Well kept. Morbidly Obese  HENT: Normal voice.   Neck: non-tender. Full ROM, no bony deformity, step-off, or crepitus. No obvious bilateral paracervical muscle spasm.  Eyes: Sclera and conjunctiva normal  Pulmonary: Normal respiratory effort. No cough.    Musculoskeletal: Normal gross range of motion of all 4 extremities. No spinal tenderness, deformity, step-off, or crepitus. Mild  right  paravertebral muscle spasm  Neurological: Alert. Normal speech. Responds appropriately. Normal sensation and strength distally.  Skin: Warm and dry. Normal appearance of visualized exposed skin.  Psych: Affect normal. Normal personal interaction. Good eye contact.    Emergency Department Course        Reviewed:  I reviewed nursing notes, vitals, past medical history, Care Everywhere    Assessments:  1905 [employer] student, obtained history and examined the patient as noted above.   2047 I rechecked the patient and explained findings.     Interventions:  1920 Lidocaine 4% 2 patch transdermal  1920 Toradol 30 mg IM  1920 Robaxin 1 g PO    Disposition:  The patient was discharged to home.     Impression & Plan     Medical Decision Making:  Maria Alejandra Winters is a 26 year old female presented today with concerns of back pain.  Unable to manage discomfort at home she presented for evaluation. Exam showed muscular source of discomfort. No focal neurological findings, no red flags for cauda equina, epidural abscess, or meningitis. Imagery not indicated. Advised to use Ibuprofen, methocarbamol, use ice, and  stretch. RX for Methocarbamol given. Follow up with PCP in 5-7 days if no improvement immediately if worsening. Advised to return to ED if develops numbness, weakness, loss of bowel or bladder, or for other concerns.     Diagnosis:    ICD-10-CM    1. Low back pain  M54.50    2. Back muscle spasm  M62.830      Discharge Medications:  START taking these medications    Details   methocarbamol (ROBAXIN) 500 MG tablet 1-2 tabs q TID PRN for muscle spasm/pain, Disp-24 tablet, R-0, Local Print     Scribe Disclosure:  FITO, Adelaida Gomez, am serving as a scribe at 7:03 PM on 3/18/2022 to document services personally performed by Ramu Degroot APRN CNP based on my observations and the provider's statements to me.        Ramu Degroot APRN CNP  03/18/22 2126

## 2022-06-04 ENCOUNTER — HOSPITAL ENCOUNTER (EMERGENCY)
Facility: CLINIC | Age: 27
Discharge: HOME | End: 2022-06-04
Attending: EMERGENCY MEDICINE | Admitting: EMERGENCY MEDICINE
Payer: MEDICAID

## 2022-06-04 VITALS
RESPIRATION RATE: 20 BRPM | HEART RATE: 88 BPM | DIASTOLIC BLOOD PRESSURE: 107 MMHG | SYSTOLIC BLOOD PRESSURE: 163 MMHG | TEMPERATURE: 98.3 F | OXYGEN SATURATION: 99 %

## 2022-06-04 DIAGNOSIS — R03.0 ELEVATED BLOOD PRESSURE READING WITHOUT DIAGNOSIS OF HYPERTENSION: ICD-10-CM

## 2022-06-04 DIAGNOSIS — K08.89 PAIN, DENTAL: ICD-10-CM

## 2022-06-04 PROCEDURE — 99283 EMERGENCY DEPT VISIT LOW MDM: CPT | Mod: 25

## 2022-06-04 PROCEDURE — 64400 NJX AA&/STRD TRIGEMINAL NRV: CPT

## 2022-06-04 RX ORDER — BUPIVACAINE HYDROCHLORIDE 5 MG/ML
10 INJECTION, SOLUTION PERINEURAL ONCE
Status: DISCONTINUED | OUTPATIENT
Start: 2022-06-04 | End: 2022-06-04 | Stop reason: HOSPADM

## 2022-06-04 RX ORDER — OXYCODONE AND ACETAMINOPHEN 5; 325 MG/1; MG/1
1-2 TABLET ORAL EVERY 6 HOURS PRN
Qty: 10 TABLET | Refills: 0 | Status: SHIPPED | OUTPATIENT
Start: 2022-06-04 | End: 2022-06-07

## 2022-06-04 ASSESSMENT — ENCOUNTER SYMPTOMS: FEVER: 0

## 2022-06-04 NOTE — LETTER
June 4, 2022      To Whom It May Concern:      Maria Alejandra Winters was seen in our Emergency Department today, 06/04/22.  I expect her condition to improve over the next 3 days.  She may return to work/school when improved.    Sincerely,        Malia LINCOLN RN

## 2022-06-04 NOTE — ED TRIAGE NOTES
Dexter tooth pulled from right lower jaw 2 days ago per patient. Lots of pain to right side of neck radiating up to her head.

## 2022-06-04 NOTE — ED PROVIDER NOTES
History   Chief Complaint:  Dental Pain       HPI   Maria Alejandra Winters is a 27 year old female with history of wisdom teeth extraction who presents with tooth pain. The patient states that she had her wisdom tooth on her right side taken out 3 days ago. The past two days the pain was fine but now she has pain in the tooth that shoots into her neck. She states it is somewhat hard to swallow now. She denies drainage or fever. She notes the dentist put her on ibuprofen and amoxicillin but no stronger medications.     Review of Systems   Constitutional: Negative for fever.   HENT: Positive for dental problem.         No dental drainage   All other systems reviewed and are negative.    Allergies:  The patient has no known allergies.     Medications:  The patient is currently on no regular medications.    Past Medical History:     Asthma  Myopia of both eyes  PCOS  Obesity  Eczema    Past Surgical History:    Tooth extraction    Social History:  The patient presents to the ED alone.    Physical Exam     Patient Vitals for the past 24 hrs:   BP Temp Temp src Pulse Resp SpO2   06/04/22 1820 -- -- -- -- -- 99 %   06/04/22 1815 (!) 163/107 -- -- 88 -- --   06/04/22 1735 (!) 154/106 -- -- 95 -- 99 %   06/04/22 1616 (!) 160/106 98.3  F (36.8  C) Temporal 102 20 99 %       Physical Exam  Vitals and nursing note reviewed.   Constitutional:       General: She is not in acute distress.     Appearance: Normal appearance. She is not ill-appearing.   HENT:      Head: Normocephalic and atraumatic.      Right Ear: External ear normal.      Left Ear: External ear normal.      Nose: Nose normal.      Mouth/Throat:     Eyes:      Extraocular Movements: Extraocular movements intact.      Conjunctiva/sclera: Conjunctivae normal.   Pulmonary:      Effort: Pulmonary effort is normal. No respiratory distress.   Musculoskeletal:         General: No deformity or signs of injury.   Skin:     Coloration: Skin is not pale.      Findings: No rash.    Neurological:      Mental Status: She is alert.   Psychiatric:         Mood and Affect: Mood normal.         Behavior: Behavior normal.           Emergency Department Course     Procedures    Dental Block     Procedure: Dental Block  Indication: Toothache/jaw pain and Post-extraction pain  Consent: Verbal  Location: #32: R lower third molar   Procedure Detail: 1.8 cc of bupivacaine 0.5% with epinephrine was injected via Inferior alveolar nerve block:  Good visualization and identification of landmarks is achieved. Anesthetic was injected using a 27g needle just lateral to the pterygomandibular raphe of the affected side, resulting in immediate pain relief of the affected side of the mandible and minimal bleeding.   Patient Status: The patient tolerated the procedure well: Yes. There were no complications.      Emergency Department Course:         Reviewed:  I reviewed nursing notes, vitals, past medical history and Care Everywhere    Assessments:   I obtained history and examined the patient as noted above.    I rechecked the patient and explained findings.     Disposition:  The patient was discharged to home.     Impression & Plan     CMS Diagnoses: None    Medical Decision Makin yo M with pain s/p wisdom tooth extraction.  May be dry socket or infection or simple post op pain.  No signs of abscess.  Dental block performed with some relief.   Will give some Percocet to help get her thru until Monday when she can f/u with her dentist.        Diagnosis:    ICD-10-CM    1. Pain, dental  K08.89    2. Elevated blood pressure reading without diagnosis of hypertension  R03.0        Discharge Medications:  Discharge Medication List as of 2022  6:15 PM      START taking these medications    Details   oxyCODONE-acetaminophen (PERCOCET) 5-325 MG tablet Take 1-2 tablets by mouth every 6 hours as needed for pain, Disp-10 tablet, R-0, E-Prescribe             Scribe Disclosure:  Trung PAYTON, am serving  as a scribe at 5:01 PM on 6/4/2022 to document services personally performed by Po Peralta MD based on my observations and the provider's statements to me.            Po Peralta MD  06/04/22 0211

## 2022-09-27 ENCOUNTER — APPOINTMENT (OUTPATIENT)
Dept: GENERAL RADIOLOGY | Facility: CLINIC | Age: 27
End: 2022-09-27
Attending: EMERGENCY MEDICINE
Payer: MEDICAID

## 2022-09-27 ENCOUNTER — HOSPITAL ENCOUNTER (EMERGENCY)
Facility: CLINIC | Age: 27
Discharge: HOME | End: 2022-09-27
Attending: EMERGENCY MEDICINE | Admitting: EMERGENCY MEDICINE
Payer: MEDICAID

## 2022-09-27 VITALS
DIASTOLIC BLOOD PRESSURE: 62 MMHG | OXYGEN SATURATION: 98 % | TEMPERATURE: 97.2 F | SYSTOLIC BLOOD PRESSURE: 117 MMHG | RESPIRATION RATE: 20 BRPM | HEART RATE: 91 BPM

## 2022-09-27 DIAGNOSIS — J06.9 VIRAL URI WITH COUGH: ICD-10-CM

## 2022-09-27 DIAGNOSIS — R51.9 ACUTE NONINTRACTABLE HEADACHE, UNSPECIFIED HEADACHE TYPE: ICD-10-CM

## 2022-09-27 LAB
ANION GAP SERPL CALCULATED.3IONS-SCNC: 9 MMOL/L (ref 7–15)
ATRIAL RATE - MUSE: 80 BPM
BASOPHILS # BLD AUTO: 0.1 10E3/UL (ref 0–0.2)
BASOPHILS NFR BLD AUTO: 1 %
BUN SERPL-MCNC: 7.7 MG/DL (ref 6–20)
CALCIUM SERPL-MCNC: 8.7 MG/DL (ref 8.6–10)
CHLORIDE SERPL-SCNC: 101 MMOL/L (ref 98–107)
CREAT SERPL-MCNC: 0.6 MG/DL (ref 0.51–0.95)
DEPRECATED HCO3 PLAS-SCNC: 27 MMOL/L (ref 22–29)
DIASTOLIC BLOOD PRESSURE - MUSE: NORMAL MMHG
EOSINOPHIL # BLD AUTO: 0.3 10E3/UL (ref 0–0.7)
EOSINOPHIL NFR BLD AUTO: 3 %
ERYTHROCYTE [DISTWIDTH] IN BLOOD BY AUTOMATED COUNT: 13.3 % (ref 10–15)
FLUAV RNA SPEC QL NAA+PROBE: NEGATIVE
FLUBV RNA RESP QL NAA+PROBE: NEGATIVE
GFR SERPL CREATININE-BSD FRML MDRD: >90 ML/MIN/1.73M2
GLUCOSE SERPL-MCNC: 98 MG/DL (ref 70–99)
HCG SERPL QL: NEGATIVE
HCT VFR BLD AUTO: 38.3 % (ref 35–47)
HGB BLD-MCNC: 11.9 G/DL (ref 11.7–15.7)
IMM GRANULOCYTES # BLD: 0 10E3/UL
IMM GRANULOCYTES NFR BLD: 0 %
INTERPRETATION ECG - MUSE: NORMAL
LYMPHOCYTES # BLD AUTO: 2.7 10E3/UL (ref 0.8–5.3)
LYMPHOCYTES NFR BLD AUTO: 27 %
MCH RBC QN AUTO: 25.5 PG (ref 26.5–33)
MCHC RBC AUTO-ENTMCNC: 31.1 G/DL (ref 31.5–36.5)
MCV RBC AUTO: 82 FL (ref 78–100)
MONOCYTES # BLD AUTO: 0.7 10E3/UL (ref 0–1.3)
MONOCYTES NFR BLD AUTO: 7 %
NEUTROPHILS # BLD AUTO: 6.3 10E3/UL (ref 1.6–8.3)
NEUTROPHILS NFR BLD AUTO: 62 %
NRBC # BLD AUTO: 0 10E3/UL
NRBC BLD AUTO-RTO: 0 /100
P AXIS - MUSE: 56 DEGREES
PLATELET # BLD AUTO: 255 10E3/UL (ref 150–450)
POTASSIUM SERPL-SCNC: 3.7 MMOL/L (ref 3.4–5.3)
PR INTERVAL - MUSE: 146 MS
QRS DURATION - MUSE: 82 MS
QT - MUSE: 392 MS
QTC - MUSE: 452 MS
R AXIS - MUSE: 10 DEGREES
RBC # BLD AUTO: 4.66 10E6/UL (ref 3.8–5.2)
RSV RNA SPEC NAA+PROBE: NEGATIVE
SARS-COV-2 RNA RESP QL NAA+PROBE: NEGATIVE
SODIUM SERPL-SCNC: 137 MMOL/L (ref 136–145)
SYSTOLIC BLOOD PRESSURE - MUSE: NORMAL MMHG
T AXIS - MUSE: -9 DEGREES
VENTRICULAR RATE- MUSE: 80 BPM
WBC # BLD AUTO: 10 10E3/UL (ref 4–11)

## 2022-09-27 PROCEDURE — 87637 SARSCOV2&INF A&B&RSV AMP PRB: CPT | Performed by: EMERGENCY MEDICINE

## 2022-09-27 PROCEDURE — 93005 ELECTROCARDIOGRAM TRACING: CPT

## 2022-09-27 PROCEDURE — 96361 HYDRATE IV INFUSION ADD-ON: CPT

## 2022-09-27 PROCEDURE — 36415 COLL VENOUS BLD VENIPUNCTURE: CPT | Performed by: EMERGENCY MEDICINE

## 2022-09-27 PROCEDURE — C9803 HOPD COVID-19 SPEC COLLECT: HCPCS

## 2022-09-27 PROCEDURE — 258N000003 HC RX IP 258 OP 636: Performed by: EMERGENCY MEDICINE

## 2022-09-27 PROCEDURE — 71046 X-RAY EXAM CHEST 2 VIEWS: CPT

## 2022-09-27 PROCEDURE — 99285 EMERGENCY DEPT VISIT HI MDM: CPT | Mod: 25

## 2022-09-27 PROCEDURE — 85014 HEMATOCRIT: CPT | Performed by: EMERGENCY MEDICINE

## 2022-09-27 PROCEDURE — 96375 TX/PRO/DX INJ NEW DRUG ADDON: CPT

## 2022-09-27 PROCEDURE — 80048 BASIC METABOLIC PNL TOTAL CA: CPT | Performed by: EMERGENCY MEDICINE

## 2022-09-27 PROCEDURE — 250N000011 HC RX IP 250 OP 636: Performed by: EMERGENCY MEDICINE

## 2022-09-27 PROCEDURE — 96374 THER/PROPH/DIAG INJ IV PUSH: CPT

## 2022-09-27 PROCEDURE — 84703 CHORIONIC GONADOTROPIN ASSAY: CPT | Performed by: EMERGENCY MEDICINE

## 2022-09-27 RX ORDER — DIPHENHYDRAMINE HYDROCHLORIDE 50 MG/ML
25 INJECTION INTRAMUSCULAR; INTRAVENOUS ONCE
Status: COMPLETED | OUTPATIENT
Start: 2022-09-27 | End: 2022-09-27

## 2022-09-27 RX ORDER — METOCLOPRAMIDE HYDROCHLORIDE 5 MG/ML
5 INJECTION INTRAMUSCULAR; INTRAVENOUS ONCE
Status: COMPLETED | OUTPATIENT
Start: 2022-09-27 | End: 2022-09-27

## 2022-09-27 RX ORDER — KETOROLAC TROMETHAMINE 15 MG/ML
15 INJECTION, SOLUTION INTRAMUSCULAR; INTRAVENOUS ONCE
Status: COMPLETED | OUTPATIENT
Start: 2022-09-27 | End: 2022-09-27

## 2022-09-27 RX ADMIN — METOCLOPRAMIDE HYDROCHLORIDE 5 MG: 5 INJECTION INTRAMUSCULAR; INTRAVENOUS at 10:06

## 2022-09-27 RX ADMIN — SODIUM CHLORIDE 1000 ML: 9 INJECTION, SOLUTION INTRAVENOUS at 10:11

## 2022-09-27 RX ADMIN — KETOROLAC TROMETHAMINE 15 MG: 15 INJECTION, SOLUTION INTRAMUSCULAR; INTRAVENOUS at 10:07

## 2022-09-27 RX ADMIN — DIPHENHYDRAMINE HYDROCHLORIDE 25 MG: 50 INJECTION, SOLUTION INTRAMUSCULAR; INTRAVENOUS at 10:06

## 2022-09-27 ASSESSMENT — ENCOUNTER SYMPTOMS
HEADACHES: 1
VOMITING: 0
FEVER: 0
DIARRHEA: 0
CHEST TIGHTNESS: 1
COUGH: 1
SORE THROAT: 0
ABDOMINAL PAIN: 0

## 2022-09-27 ASSESSMENT — ACTIVITIES OF DAILY LIVING (ADL)
ADLS_ACUITY_SCORE: 35
ADLS_ACUITY_SCORE: 35

## 2022-09-27 NOTE — ED PROVIDER NOTES
"  History   Chief Complaint:  Cough and headache    The history is provided by the patient.      Maria Alejandra Winters is a 27 year old female with history of asthma who presents with cough and headache. For the last week she has had cough productive of green sputum and chest tightness. Last night she developed diffuse headache. She did take Tylenol 3.5 hours prior to arrival but her headache is still 9/10 in intensity. She has photophobia. She has no associated fever, vomiting, diarrhea, leg swelling, abdominal pain, or sore throat.    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 She expresses concern for an infection and that it might alter plans to travel to [facility] soon.    Review of Systems   Constitutional: Negative for fever.   HENT: Negative for sore throat.    Eyes: Positive for photophobia.   Respiratory: Positive for cough and chest tightness.    Cardiovascular: Negative for leg swelling.   Gastrointestinal: Negative for abdominal pain, diarrhea and vomiting.   Neurological: Positive for headaches.   All other systems reviewed and are negative.    Allergies:  The patient has no known allergies.     Medications:  The patient denies taking any routine medications     Past Medical History:     Asthma   Polycystic ovarian syndrome    Atypical pneumonia   Obesity   Scoliosis     Past Surgical History:    Bronchoscopy     Family History:    Hypertension    Cervical dysplasia     Social History:  Patient is unaccompanied in the ED.  Patient uses marijuana.  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 Well appearing young woman " in room 27. Cooperative.  Head:  Atraumatic.  Eyes:  Conjunctivae, lids, and sclerae are normal.  ENT:    No focal frontal or maxillary sinus tenderness.  Neck:  Supple. Normal range of motion. No nuchal rigidity.  CV:  Regular rate and rhythm. Normal heart sounds with no murmurs, rubs, or gallops detected.  Resp:  No respiratory distress. Clear to auscultation bilaterally without decreased breath sounds, wheezing, rales, or rhonchi.  GI:  Soft. Non-distended. Non-tender.    MS:  Normal ROM. No bilateral lower extremity edema.  Skin:  Warm. Non-diaphoretic. No pallor.  Neuro:  Awake. A&Ox3. Normal strength.  Psych: Normal mood and affect. Normal speech.  Vitals reviewed.    Emergency Department Course     Imaging:  XR Chest 2 Views   Final Result   IMPRESSION: PA and lateral views of the chest were obtained.   Cardiomediastinal silhouette is within normal limits. No suspicious   focal pulmonary opacities. No significant pleural effusion or   pneumothorax.      ADAM STREETER MD            SYSTEM ID:  [id number]        Report per radiology    Laboratory:  Labs Ordered and Resulted from Time of ED Arrival to Time of ED Departure   CBC WITH PLATELETS AND DIFFERENTIAL - Abnormal       Result Value    WBC Count 10.0      RBC Count 4.66      Hemoglobin 11.9      Hematocrit 38.3      MCV 82      MCH 25.5 (*)     MCHC 31.1 (*)     RDW 13.3      Platelet Count 255      % Neutrophils 62      % Lymphocytes 27      % Monocytes 7      % Eosinophils 3      % Basophils 1      % Immature Granulocytes 0      NRBCs per 100 WBC 0      Absolute Neutrophils 6.3      Absolute Lymphocytes 2.7      Absolute Monocytes 0.7      Absolute Eosinophils 0.3      Absolute Basophils 0.1      Absolute Immature Granulocytes 0.0      Absolute NRBCs 0.0     INFLUENZA A/B & SARS-COV2 PCR MULTIPLEX - Normal    Influenza A PCR Negative      Influenza B PCR Negative      RSV PCR Negative      SARS CoV2 PCR Negative     BASIC METABOLIC PANEL - Normal     Sodium 137      Potassium 3.7      Chloride 101      Carbon Dioxide (CO2) 27      Anion Gap 9      Urea Nitrogen 7.7      Creatinine 0.60      Calcium 8.7      Glucose 98      GFR Estimate >90     HCG QUALITATIVE PREGNANCY - Normal    hCG Serum Qualitative Negative        Emergency Department Course:  Reviewed:  I reviewed nursing notes, vitals, past medical history, and Care Everywhere.    Assessments:  0842 I obtained history and examined the patient as noted above.   1052 I rechecked the patient who's headache is now 0/10.     Interventions:  1006 Reglan 5 mg IV  1006 Benadryl 25 mg IV  1007 Toradol 15 mg IV  1011 NS 1000 mL IV    Disposition:  The patient was discharged home.     Impression & Plan   Medical Decision Making:  Maria Alejandra is a 27-year-old woman who has had chest tightness and productive cough for several days.  She ultimately presented today because she developed a headache last night and is concerned she may not be able to travel to [place] as she has planned.  On exam she appears well, though mildly uncomfortable.  She is afebrile. She describes her headache as diffuse and she does not have focal sinus tenderness to suggest this is sinusitis.  She does have photophobia but does not have any nuchal rigidity to suggest meningitis.  LP is not indicated. Her chest tightness is felt to be related to infection but EKG was obtained.  This is without arrhythmia or ischemia.  Fortunately, chest x-ray does not reveal pneumonia or other acute pathology.  Similarly, COVID-19, influenza, and RSV testing is negative.  She has no leukocytosis or anemia.  She has no kidney injury or electrolyte derangements.  She is not pregnant.  She was treated with Toradol, Benadryl, Reglan, and IV fluids and had complete resolution of her headache.  As such, she is appropriate for discharge.  Her symptoms are most consistent with a viral URI/viral syndrome and she does not require antibiotics.  I counseled her she needs lots of  rest and fluids with Tylenol or ibuprofen as needed for recurrent headache or any other pain.  She should improve with time and supportive care but should follow-up with primary care to ensure she is improving as we expect.  I recommended she return with worsening or new symptoms and counseled her she is appropriate for travel based on her emergency department visit today.  All questions answered.  Amenable to discharge.    Diagnosis:    ICD-10-CM    1. Viral URI with cough  J06.9    2. Acute nonintractable headache, unspecified headache type  R51.9        Scribe Disclosure:  I, Joe Baldwin, am serving as a scribe at 8:42 AM on 9/27/2022 to document services personally performed by Micaela Guerra MD based on my observations and the provider's statements to me.          Micaela Guerra MD  10/08/22 [id number]

## 2022-09-27 NOTE — DISCHARGE INSTRUCTIONS
Lots of rest and fluids.    Tylenol or ibuprofen as needed for pain.    Return with worsening or new symptoms.    Otherwise, follow-up with your primary care provider to ensure you are improving as expected.

## 2022-09-27 NOTE — ED TRIAGE NOTES
Pt arrives with c/o URI symptoms for 1 week and a headache, productive cough and chest discomfort for the past few days. Pt took tylenol yesterday for her headache but reports it wasn't helping.     Triage Assessment     Row Name 09/27/22 0731       Triage Assessment (Adult)    Airway WDL WDL       Respiratory WDL    Respiratory WDL WDL       Skin Circulation/Temperature WDL    Skin Circulation/Temperature WDL WDL       Cardiac WDL    Cardiac WDL WDL       Peripheral/Neurovascular WDL    Peripheral Neurovascular WDL WDL       Cognitive/Neuro/Behavioral WDL    Cognitive/Neuro/Behavioral WDL WDL

## 2022-10-08 ASSESSMENT — ENCOUNTER SYMPTOMS: PHOTOPHOBIA: 1

## 2022-11-03 ENCOUNTER — APPOINTMENT (OUTPATIENT)
Dept: URBAN - METROPOLITAN AREA CLINIC 253 | Age: 27
Setting detail: DERMATOLOGY
End: 2022-11-10

## 2022-11-03 VITALS — HEIGHT: 66 IN | RESPIRATION RATE: 14 BRPM | WEIGHT: 293 LBS

## 2022-11-03 DIAGNOSIS — L70.0 ACNE VULGARIS: ICD-10-CM

## 2022-11-03 DIAGNOSIS — L20.89 OTHER ATOPIC DERMATITIS: ICD-10-CM

## 2022-11-03 PROCEDURE — OTHER ADDITIONAL NOTES: OTHER

## 2022-11-03 PROCEDURE — OTHER DUPIXENT INITIATION: OTHER

## 2022-11-03 PROCEDURE — OTHER COUNSELING: OTHER

## 2022-11-03 PROCEDURE — OTHER PRESCRIPTION: OTHER

## 2022-11-03 PROCEDURE — 99204 OFFICE O/P NEW MOD 45 MIN: CPT

## 2022-11-03 PROCEDURE — OTHER MIPS QUALITY: OTHER

## 2022-11-03 RX ORDER — BETAMETHASONE DIPROPIONATE 0.5 MG/G
0.05% CREAM TOPICAL BID
Qty: 45 | Refills: 1 | Status: ERX | COMMUNITY
Start: 2022-11-03

## 2022-11-03 RX ORDER — DUPILUMAB 300 MG/2ML
300MG INJECTION, SOLUTION SUBCUTANEOUS EVERY OTHER WEEK
Qty: 2 | Refills: 0 | Status: ERX | COMMUNITY
Start: 2022-11-03

## 2022-11-03 RX ORDER — SALICYLIC ACID 3 G/100G
180 LOTION TOPICAL QD
Qty: 30 | Refills: 11 | Status: ERX | COMMUNITY
Start: 2022-11-03

## 2022-11-03 RX ORDER — DUPILUMAB 300 MG/2ML
300MG INJECTION, SOLUTION SUBCUTANEOUS
Qty: 4 | Refills: 0 | Status: ERX

## 2022-11-03 RX ORDER — TRETINOIN 0.25 MG/G
0.025% CREAM TOPICAL QHS
Qty: 45 | Refills: 1 | Status: ERX | COMMUNITY
Start: 2022-11-03

## 2022-11-03 ASSESSMENT — LOCATION SIMPLE DESCRIPTION DERM
LOCATION SIMPLE: LEFT FOREARM
LOCATION SIMPLE: RIGHT ANTERIOR NECK
LOCATION SIMPLE: RIGHT UPPER ARM
LOCATION SIMPLE: UPPER BACK
LOCATION SIMPLE: LEFT CHEEK
LOCATION SIMPLE: RIGHT FOREARM
LOCATION SIMPLE: CHEST
LOCATION SIMPLE: LEFT UPPER ARM

## 2022-11-03 ASSESSMENT — LOCATION ZONE DERM
LOCATION ZONE: NECK
LOCATION ZONE: FACE
LOCATION ZONE: TRUNK
LOCATION ZONE: ARM

## 2022-11-03 ASSESSMENT — LOCATION DETAILED DESCRIPTION DERM
LOCATION DETAILED: LEFT VENTRAL DISTAL FOREARM
LOCATION DETAILED: RIGHT INFERIOR ANTERIOR NECK
LOCATION DETAILED: UPPER STERNUM
LOCATION DETAILED: RIGHT VENTRAL DISTAL FOREARM
LOCATION DETAILED: LEFT CENTRAL MALAR CHEEK
LOCATION DETAILED: SUPERIOR THORACIC SPINE
LOCATION DETAILED: LEFT ANTECUBITAL SKIN
LOCATION DETAILED: RIGHT ANTECUBITAL SKIN

## 2022-11-03 ASSESSMENT — BSA ECZEMA: % BODY COVERED IN ECZEMA: 17

## 2022-11-03 NOTE — HPI: RASH
What Type Of Note Output Would You Prefer (Optional)?: Standard Output
Is The Patient Presenting As Previously Scheduled?: Yes
How Severe Is Your Rash?: severe
Is This A New Presentation, Or A Follow-Up?: Rash
Additional History: She has struggles with sensitivity in her skin and gets rashes from many washes and moisturizers. She has tried cerave, eucerin, cetaphil, vanicream products and always gets irritation. \\n\\nHer most significant flare is on her back, neck, arms and chest. \\n\\nHas not tried antihistamines, no known allergies. She has asthma, but is well controlled. \\n\\nHer itching is worse at night, \\n\\nHas tried triamcinolone for skin management and it doesn’t work (body) has tried hydrocortisone cream (face). She thinks she has been seen about 5 times throughout the years for management, \\n Currently using Vaseline on her face, but getting acne.

## 2022-11-03 NOTE — PROCEDURE: COUNSELING
Topical Sulfur Applications Counseling: Topical Sulfur Counseling: Patient counseled that this medication may cause skin irritation or allergic reactions.  In the event of skin irritation, the patient was advised to reduce the amount of the drug applied or use it less frequently.   The patient verbalized understanding of the proper use and possible adverse effects of topical sulfur application.  All of the patient's questions and concerns were addressed.
Use Enhanced Medication Counseling?: No
Isotretinoin Pregnancy And Lactation Text: This medication is Pregnancy Category X and is considered extremely dangerous during pregnancy. It is unknown if it is excreted in breast milk.
Winlevi Pregnancy And Lactation Text: This medication is considered safe during pregnancy and breastfeeding.
Spironolactone Pregnancy And Lactation Text: This medication can cause feminization of the male fetus and should be avoided during pregnancy. The active metabolite is also found in breast milk.
Aklief counseling:  Patient advised to apply a pea-sized amount only at bedtime and wait 30 minutes after washing their face before applying.  If too drying, patient may add a non-comedogenic moisturizer.  The most commonly reported side effects including irritation, redness, scaling, dryness, stinging, burning, itching, and increased risk of sunburn.  The patient verbalized understanding of the proper use and possible adverse effects of retinoids.  All of the patient's questions and concerns were addressed.
Aklief Pregnancy And Lactation Text: It is unknown if this medication is safe to use during pregnancy.  It is unknown if this medication is excreted in breast milk.  Breastfeeding women should use the topical cream on the smallest area of the skin for the shortest time needed while breastfeeding.  Do not apply to nipple and areola.
Azelaic Acid Counseling: Patient counseled that medicine may cause skin irritation and to avoid applying near the eyes.  In the event of skin irritation, the patient was advised to reduce the amount of the drug applied or use it less frequently.   The patient verbalized understanding of the proper use and possible adverse effects of azelaic acid.  All of the patient's questions and concerns were addressed.
Topical Retinoid counseling:  Patient advised to apply a pea-sized amount only at bedtime and wait 30 minutes after washing their face before applying.  If too drying, patient may add a non-comedogenic moisturizer. The patient verbalized understanding of the proper use and possible adverse effects of retinoids.  All of the patient's questions and concerns were addressed.
Tetracycline Counseling: Patient counseled regarding possible photosensitivity and increased risk for sunburn.  Patient instructed to avoid sunlight, if possible.  When exposed to sunlight, patients should wear protective clothing, sunglasses, and sunscreen.  The patient was instructed to call the office immediately if the following severe adverse effects occur:  hearing changes, easy bruising/bleeding, severe headache, or vision changes.  The patient verbalized understanding of the proper use and possible adverse effects of tetracycline.  All of the patient's questions and concerns were addressed. Patient understands to avoid pregnancy while on therapy due to potential birth defects.
Dapsone Counseling: I discussed with the patient the risks of dapsone including but not limited to hemolytic anemia, agranulocytosis, rashes, methemoglobinemia, kidney failure, peripheral neuropathy, headaches, GI upset, and liver toxicity.  Patients who start dapsone require monitoring including baseline LFTs and weekly CBCs for the first month, then every month thereafter.  The patient verbalized understanding of the proper use and possible adverse effects of dapsone.  All of the patient's questions and concerns were addressed.
Tazorac Counseling:  Patient advised that medication is irritating and drying.  Patient may need to apply sparingly and wash off after an hour before eventually leaving it on overnight.  The patient verbalized understanding of the proper use and possible adverse effects of tazorac.  All of the patient's questions and concerns were addressed.
Benzoyl Peroxide Counseling: Patient counseled that medicine may cause skin irritation and bleach clothing.  In the event of skin irritation, the patient was advised to reduce the amount of the drug applied or use it less frequently.   The patient verbalized understanding of the proper use and possible adverse effects of benzoyl peroxide.  All of the patient's questions and concerns were addressed.
Bactrim Pregnancy And Lactation Text: This medication is Pregnancy Category D and is known to cause fetal risk.  It is also excreted in breast milk.
Benzoyl Peroxide Pregnancy And Lactation Text: This medication is Pregnancy Category C. It is unknown if benzoyl peroxide is excreted in breast milk.
Tazorac Pregnancy And Lactation Text: This medication is not safe during pregnancy. It is unknown if this medication is excreted in breast milk.
Winlevi Counseling:  I discussed with the patient the risks of topical clascoterone including but not limited to erythema, scaling, itching, and stinging. Patient voiced their understanding.
Minocycline Counseling: Patient advised regarding possible photosensitivity and discoloration of the teeth, skin, lips, tongue and gums.  Patient instructed to avoid sunlight, if possible.  When exposed to sunlight, patients should wear protective clothing, sunglasses, and sunscreen.  The patient was instructed to call the office immediately if the following severe adverse effects occur:  hearing changes, easy bruising/bleeding, severe headache, or vision changes.  The patient verbalized understanding of the proper use and possible adverse effects of minocycline.  All of the patient's questions and concerns were addressed.
Spironolactone Counseling: Patient advised regarding risks of diarrhea, abdominal pain, hyperkalemia, birth defects (for female patients), liver toxicity and renal toxicity. The patient may need blood work to monitor liver and kidney function and potassium levels while on therapy. The patient verbalized understanding of the proper use and possible adverse effects of spironolactone.  All of the patient's questions and concerns were addressed.
Detail Level: Zone
Dapsone Pregnancy And Lactation Text: This medication is Pregnancy Category C and is not considered safe during pregnancy or breast feeding.
Topical Retinoid Pregnancy And Lactation Text: This medication is Pregnancy Category C. It is unknown if this medication is excreted in breast milk.
Sarecycline Counseling: Patient advised regarding possible photosensitivity and discoloration of the teeth, skin, lips, tongue and gums.  Patient instructed to avoid sunlight, if possible.  When exposed to sunlight, patients should wear protective clothing, sunglasses, and sunscreen.  The patient was instructed to call the office immediately if the following severe adverse effects occur:  hearing changes, easy bruising/bleeding, severe headache, or vision changes.  The patient verbalized understanding of the proper use and possible adverse effects of sarecycline.  All of the patient's questions and concerns were addressed.
Azelaic Acid Pregnancy And Lactation Text: This medication is considered safe during pregnancy and breast feeding.
Sarecycline Pregnancy And Lactation Text: This medication is Pregnancy Category D and not consider safe during pregnancy. It is also excreted in breast milk.
Detail Level: Detailed
High Dose Vitamin A Pregnancy And Lactation Text: High dose vitamin A therapy is contraindicated during pregnancy and breast feeding.
Doxycycline Pregnancy And Lactation Text: This medication is Pregnancy Category D and not consider safe during pregnancy. It is also excreted in breast milk but is considered safe for shorter treatment courses.
Erythromycin Pregnancy And Lactation Text: This medication is Pregnancy Category B and is considered safe during pregnancy. It is also excreted in breast milk.
Isotretinoin Counseling: Patient should get monthly blood tests, not donate blood, not drive at night if vision affected, not share medication, and not undergo elective surgery for 6 months after tx completed. Side effects reviewed, pt to contact office should one occur.
Topical Clindamycin Counseling: Patient counseled that this medication may cause skin irritation or allergic reactions.  In the event of skin irritation, the patient was advised to reduce the amount of the drug applied or use it less frequently.   The patient verbalized understanding of the proper use and possible adverse effects of clindamycin.  All of the patient's questions and concerns were addressed.
Azithromycin Pregnancy And Lactation Text: This medication is considered safe during pregnancy and is also secreted in breast milk.
Topical Sulfur Applications Pregnancy And Lactation Text: This medication is Pregnancy Category C and has an unknown safety profile during pregnancy. It is unknown if this topical medication is excreted in breast milk.
Patient Specific Counseling (Will Not Stick From Patient To Patient): Recommended a topical retinoid. She reports a hx of sensitivity and dryness. \\nSamples of Cetaphil and CeraVe washes given. LaRoche Pose  moisturizers given. \\nShe has been using Vaseline on her face. Informed her that the Vaseline can contribute to her acne and to stop it.
Birth Control Pills Pregnancy And Lactation Text: This medication should be avoided if pregnant and for the first 30 days post-partum.
Bactrim Counseling:  I discussed with the patient the risks of sulfa antibiotics including but not limited to GI upset, allergic reaction, drug rash, diarrhea, dizziness, photosensitivity, and yeast infections.  Rarely, more serious reactions can occur including but not limited to aplastic anemia, agranulocytosis, methemoglobinemia, blood dyscrasias, liver or kidney failure, lung infiltrates or desquamative/blistering drug rashes.
Topical Clindamycin Pregnancy And Lactation Text: This medication is Pregnancy Category B and is considered safe during pregnancy. It is unknown if it is excreted in breast milk.
Birth Control Pills Counseling: Birth Control Pill Counseling: I discussed with the patient the potential side effects of OCPs including but not limited to increased risk of stroke, heart attack, thrombophlebitis, deep venous thrombosis, hepatic adenomas, breast changes, GI upset, headaches, and depression.  The patient verbalized understanding of the proper use and possible adverse effects of OCPs. All of the patient's questions and concerns were addressed.
High Dose Vitamin A Counseling: Side effects reviewed, pt to contact office should one occur.
Doxycycline Counseling:  Patient counseled regarding possible photosensitivity and increased risk for sunburn.  Patient instructed to avoid sunlight, if possible.  When exposed to sunlight, patients should wear protective clothing, sunglasses, and sunscreen.  The patient was instructed to call the office immediately if the following severe adverse effects occur:  hearing changes, easy bruising/bleeding, severe headache, or vision changes.  The patient verbalized understanding of the proper use and possible adverse effects of doxycycline.  All of the patient's questions and concerns were addressed.
Erythromycin Counseling:  I discussed with the patient the risks of erythromycin including but not limited to GI upset, allergic reaction, drug rash, diarrhea, increase in liver enzymes, and yeast infections.
Azithromycin Counseling:  I discussed with the patient the risks of azithromycin including but not limited to GI upset, allergic reaction, drug rash, diarrhea, and yeast infections.

## 2022-11-03 NOTE — PROCEDURE: ADDITIONAL NOTES
Render Risk Assessment In Note?: no
Additional Notes: She has tried TMC and hydrocortisone. \\nShe has not seen an allergist or done patch testing. \\nHx of asthma. \\nShe has not taken a daily antihistamine. Recommended she take a daily antihistamine. Recommended a stronger topical steriod.
Detail Level: Detailed

## 2022-11-03 NOTE — PROCEDURE: DUPIXENT INITIATION
Is Soriatane Contraindicated?: No
Detail Level: Zone
Dupixent Dosing: 600 mg SC day 0 then 300 mg SC every other week
Diagnosis (Required): Atopic Dermatitis/Eczematous Dermatitis
Pregnancy And Lactation Warning Text: There have not been adverse fetal risks in women taking Dupixent while pregnant. It is unknown if this medication is excreted in breast milk.
Dupixent Monitoring Guidelines: There is no laboratory monitoring requirement with Dupixent.

## 2022-12-04 VITALS
SYSTOLIC BLOOD PRESSURE: 159 MMHG | OXYGEN SATURATION: 98 % | RESPIRATION RATE: 20 BRPM | HEART RATE: 116 BPM | TEMPERATURE: 98 F | DIASTOLIC BLOOD PRESSURE: 102 MMHG

## 2022-12-04 PROCEDURE — 99283 EMERGENCY DEPT VISIT LOW MDM: CPT

## 2022-12-04 PROCEDURE — 87637 SARSCOV2&INF A&B&RSV AMP PRB: CPT | Performed by: EMERGENCY MEDICINE

## 2022-12-04 PROCEDURE — C9803 HOPD COVID-19 SPEC COLLECT: HCPCS

## 2022-12-05 ENCOUNTER — HOSPITAL ENCOUNTER (EMERGENCY)
Facility: CLINIC | Age: 27
Discharge: HOME | End: 2022-12-05
Attending: EMERGENCY MEDICINE | Admitting: EMERGENCY MEDICINE
Payer: MEDICAID

## 2022-12-05 DIAGNOSIS — Z77.21 EXPOSURE TO BODY FLUID: ICD-10-CM

## 2022-12-05 DIAGNOSIS — U07.1 INFECTION DUE TO 2019 NOVEL CORONAVIRUS: ICD-10-CM

## 2022-12-05 DIAGNOSIS — B33.8 RSV INFECTION: ICD-10-CM

## 2022-12-05 LAB
FLUAV RNA SPEC QL NAA+PROBE: NEGATIVE
FLUBV RNA RESP QL NAA+PROBE: NEGATIVE
RSV RNA SPEC NAA+PROBE: POSITIVE
SARS-COV-2 RNA RESP QL NAA+PROBE: POSITIVE

## 2022-12-05 PROCEDURE — 250N000013 HC RX MED GY IP 250 OP 250 PS 637: Performed by: EMERGENCY MEDICINE

## 2022-12-05 RX ORDER — NIRMATRELVIR AND RITONAVIR 300-100 MG
3 KIT ORAL 2 TIMES DAILY
Qty: 30 EACH | Refills: 0 | Status: SHIPPED | OUTPATIENT
Start: 2022-12-05 | End: 2023-01-23

## 2022-12-05 RX ORDER — ACETAMINOPHEN 500 MG
1000 TABLET ORAL ONCE
Status: COMPLETED | OUTPATIENT
Start: 2022-12-05 | End: 2022-12-05

## 2022-12-05 RX ADMIN — ACETAMINOPHEN 1000 MG: 500 TABLET, FILM COATED ORAL at 02:59

## 2022-12-05 ASSESSMENT — ENCOUNTER SYMPTOMS
SHORTNESS OF BREATH: 1
COUGH: 1
WHEEZING: 0
FEVER: 1
MYALGIAS: 1
CHILLS: 1
HEADACHES: 1

## 2022-12-05 ASSESSMENT — ACTIVITIES OF DAILY LIVING (ADL): ADLS_ACUITY_SCORE: 33

## 2022-12-05 NOTE — ED TRIAGE NOTES
Pt arrives to ED with covid and Hep B exposure from Friday night. Sx started today. meds without relief.

## 2022-12-05 NOTE — DISCHARGE INSTRUCTIONS
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
## 2023-03-16 VITALS
DIASTOLIC BLOOD PRESSURE: 92 MMHG | TEMPERATURE: 97.5 F | OXYGEN SATURATION: 98 % | SYSTOLIC BLOOD PRESSURE: 150 MMHG | HEART RATE: 108 BPM | RESPIRATION RATE: 19 BRPM

## 2023-03-16 PROCEDURE — 99284 EMERGENCY DEPT VISIT MOD MDM: CPT

## 2023-03-16 PROCEDURE — 250N000013 HC RX MED GY IP 250 OP 250 PS 637: Performed by: EMERGENCY MEDICINE

## 2023-03-16 RX ORDER — ACETAMINOPHEN 500 MG
1000 TABLET ORAL ONCE
Status: COMPLETED | OUTPATIENT
Start: 2023-03-16 | End: 2023-03-16

## 2023-03-16 RX ORDER — IBUPROFEN 600 MG/1
600 TABLET, FILM COATED ORAL ONCE
Status: COMPLETED | OUTPATIENT
Start: 2023-03-16 | End: 2023-03-16

## 2023-03-16 RX ADMIN — ACETAMINOPHEN 1000 MG: 500 TABLET ORAL at 22:17

## 2023-03-16 RX ADMIN — IBUPROFEN 600 MG: 600 TABLET, FILM COATED ORAL at 22:17

## 2023-03-17 ENCOUNTER — HOSPITAL ENCOUNTER (EMERGENCY)
Facility: CLINIC | Age: 28
Discharge: HOME | End: 2023-03-17
Attending: EMERGENCY MEDICINE | Admitting: EMERGENCY MEDICINE
Payer: OTHER MISCELLANEOUS

## 2023-03-17 DIAGNOSIS — M54.6 ACUTE MIDLINE THORACIC BACK PAIN: ICD-10-CM

## 2023-03-17 DIAGNOSIS — S16.1XXA STRAIN OF NECK MUSCLE, INITIAL ENCOUNTER: ICD-10-CM

## 2023-03-17 RX ORDER — METHYLPREDNISOLONE 4 MG
TABLET, DOSE PACK ORAL
Qty: 21 TABLET | Refills: 0 | Status: SHIPPED | OUTPATIENT
Start: 2023-03-17

## 2023-03-17 RX ORDER — CYCLOBENZAPRINE HCL 10 MG
10 TABLET ORAL EVERY 8 HOURS PRN
Qty: 15 TABLET | Refills: 0 | Status: SHIPPED | OUTPATIENT
Start: 2023-03-17 | End: 2023-03-23

## 2023-03-17 ASSESSMENT — ENCOUNTER SYMPTOMS
BACK PAIN: 1
NECK PAIN: 1
HEADACHES: 1

## 2023-03-17 ASSESSMENT — ACTIVITIES OF DAILY LIVING (ADL): ADLS_ACUITY_SCORE: 35

## 2023-03-17 NOTE — ED TRIAGE NOTES
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  Hx of back pain in the past.  A&O x 4 with VSS on RA.

## 2023-03-17 NOTE — ED PROVIDER NOTES
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 She explains pain through the entire neck and a headache described as a \"pressure\" up the back of her head. Her back pain is described as being \"on fire\" throughout the upper region. Her headache has resolved somewhat while at the ED. She has only taken Ibuprofen and Tylenol provided at the ED and has not iced/heated the affected areas. She denies any history of diabetes mellitus.     Independent Historian:   None - Patient Only    Review of External Notes: Reviewed Care Everywhere notes.      Review of Systems   Musculoskeletal: Positive for back pain and neck pain.   Neurological: Positive for headaches.   All other systems reviewed and are negative.    Allergies:  No Known Allergies     Medications:    None    Past Medical History:    Diagnosis     Asthma     Eczema     PCOS (polycystic ovarian syndrome)     Past Surgical History:    History reviewed. No past pertinent surgical history.      Family History:    History reviewed. No pertinent family history.     Social History:  Presents to the ED alone.  PCP: [facility]     Physical Exam     Patient Vitals for the past 24 hrs:   BP Temp Temp src Pulse Resp SpO2   03/16/23 1954  150/92 97.5  F (36.4  C) Temporal 108 19 98 %     Physical Exam  Nursing note and vitals reviewed.  Constitutional: Cooperative. Sitting up comfortably in a chair.   HENT:   Mouth/Throat: Mucous membranes are normal.   Cardiovascular: Normal rate, regular rhythm and normal heart sounds.  No murmur.  Pulmonary/Chest: Effort normal and breath sounds normal. No respiratory distress. No wheezes. No rales.   Musculoskeletal: Normal range of motion of UE's.   Neurological: Alert. Normal strength and sensation in upper extremities.    Skin: Skin is warm and dry. "   Psychiatric: Normal mood and affect.     Emergency Department Course     Interventions:  Medications   acetaminophen (TYLENOL) tablet 1,000 mg (1,000 mg Oral $Given 3/16/23 2217)   ibuprofen (ADVIL/MOTRIN) tablet 600 mg (600 mg Oral $Given 3/16/23 2217)     Assessments:  0018 I obtained history and examined the patient as noted above. I believe that they are safe for discharge at this time.     Independent Interpretation (X-rays, CTs, rhythm strip):  None    Consultations/Discussion of Management or Tests:  None     Social Determinants of Health affecting care:   None    Disposition:  The patient was discharged to home.     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  She has a history of back pain in the past.  The patient did not sustain any trauma, therefore x-rays are not necessary due to the low likelihood of fracture or subluxation.  No red flag symptoms to suggest CT and/or MRI is indicated at this point.  There is no clinical evidence of cauda equina syndrome, discitis, spinal/epidural space hematoma or epidural abscess or other worrisome etiology. The neurological exam is normal and the patient's symptoms seem consistent with musculoskeletal issues and significant muscle spasm.   The patient will be discharged with pain medications to use as directed. Ice or heat to the back and stretching exercises. No heavy lifting, bending or twisting. Return if increasing pain, numbness, weakness, or bowel or bladder dysfunction. She was advised to schedule follow-up with her primary doctor within 3 days to re-assess symptoms.    Diagnosis:    ICD-10-CM    1. Acute midline thoracic back pain  M54.6       2. Strain of neck muscle, initial encounter  S16.1XXA         Discharge Medications:  New Prescriptions    CYCLOBENZAPRINE (FLEXERIL) 10 MG TABLET    Take 1 tablet (10 mg) by mouth  every 8 hours as needed for muscle spasms    METHYLPREDNISOLONE (MEDROL DOSEPAK) 4 MG TABLET THERAPY PACK    Follow Package Directions     Scribe Disclosure:  I, Avi Ramos, am serving as a scribe at 12:31 AM on 3/17/2023 to document services personally performed by Roe Palacios MD based on my observations and the provider's statements to me.   3/17/2023   Roe Palacios MD Amdahl, John, MD  03/17/23 0424

## 2023-03-17 NOTE — Clinical Note
Maria Alejandra Winters was seen and treated in our emergency department on 3/16/2023.  She may return to work on 03/20/2023.  Ms Winters was evaluated in the ED.  Please excuse her from work this weekend to allow her to heal from her back injury.      If you have any questions or concerns, please don't hesitate to call.      Roe Palacios MD

## 2023-03-27 ENCOUNTER — TRANSCRIBE ORDERS (OUTPATIENT)
Dept: OTHER | Age: 28
End: 2023-03-27

## 2023-03-27 DIAGNOSIS — M54.6 ACUTE BILATERAL THORACIC BACK PAIN: Primary | ICD-10-CM

## 2023-03-30 ENCOUNTER — THERAPY VISIT (OUTPATIENT)
Dept: PHYSICAL THERAPY | Facility: CLINIC | Age: 28
End: 2023-03-30
Attending: FAMILY MEDICINE
Payer: OTHER MISCELLANEOUS

## 2023-03-30 DIAGNOSIS — M54.6 ACUTE BILATERAL THORACIC BACK PAIN: ICD-10-CM

## 2023-03-30 PROCEDURE — 97161 PT EVAL LOW COMPLEX 20 MIN: CPT | Mod: GP

## 2023-03-30 PROCEDURE — 97110 THERAPEUTIC EXERCISES: CPT | Mod: GP

## 2023-03-30 NOTE — PROGRESS NOTES
[facility]    OUTPATIENT Physical Therapy ORTHOPEDIC EVALUATION  PLAN OF TREATMENT FOR OUTPATIENT REHABILITATION  (COMPLETE FOR INITIAL CLAIMS ONLY)  Patient's Last Name, First Name, M.I.  YOB: 1995  Maria Alejandra Winters    Provider s Name:  [facility]   Medical Record No.  [id number]   Start of Care Date:  03/30/23   Onset Date:   03/17/23   Treatment Diagnosis:  neck/upper back pain and HA Medical Diagnosis:  Acute bilateral thoracic back pain       Goals:     03/30/23 0500   Body Part   Goals listed below are for upper back/neck pain   Goal #1   Goal #1 headaches   Previous Functional Level No headaches   Current Functional Level Headache frequency per week is   Performance Level 4x/week pain up to 10/10   STG Target Performance Reduce frequency of headaches in a week to   Performance Level 2x/week, pain no greater than 5/10   Rationale for full and safe concentration;to establish restorative sleep pattern;to improve quality of life and resume normal social activities;to reduce missed time at work   Due Date 04/27/23   LTG Target Performance Reduce frequency of headaches in a month to   Performance Level 2x/mo pain no greater than 2/10   Rationale for full and safe concentration;to establish restorative sleep pattern;to improve quality of life and resume normal social activities;to reduce missed time at work   Due Date 06/22/23       Therapy Frequency:  1x/week  Predicted Duration of Therapy Intervention:  4 weeks tapering to 2x/mo for 8 weeks    Aneta Guevara, PT                 I CERTIFY THE NEED FOR THESE SERVICES FURNISHED UNDER        THIS PLAN OF TREATMENT AND WHILE UNDER MY CARE .             Physician Signature               Date    X_____________________________________________________                             Certification Date From:  03/30/23    Certification Date To:  06/22/23    Referring Provider:  Misa Yousif    Initial Assessment        See Epic Evaluation SOC Date: 03/30/23

## 2023-03-30 NOTE — PROGRESS NOTES
Physical Therapy Initial Evaluation  Therapist Impression: Maria Alejandra is a 27 year old year old female referred to physical therapy by Misa Yousif MD for treatment of thoracic back pain. Subjective history and objective findings are consistent with neck and thoracic back pain. Due to these impairments, patient has difficulty with certain positions, lifting, daily chores/work tasks. Patient will benefit from skilled PT to address impairments/limitations in order to reach patient's goals, facilitate return to prior level of function, and maximize participation.    KEY FINDINGS:  1. Loss general CROM and thoracic extension/flexion  2. No myotome findings  3. TTP neck and upper back    Subjective:  The history is provided by the patient. No  was used.   Therapist Generated HPI Evaluation  Problem details: Pt presents with upper back/neck pain. She was at work and she was turning a patient using chux and after this her neck started to hurt, went into upper back and causing a headache. She went to the ED, was provided with muscle relaxors. These didn't seem to help initially, she is now on a different kind that is working better.  Headaches are several times a week. Pain at worst 10/10..         Type of problem:  Cervical spine and thoracic spine.    This is a new condition.  Condition occurred with:  Lifting.  Where condition occurred: at work.  Patient reports pain:  Cervical right side and cervical left side (upper back).  Pain is described as aching (burning) and is constant.  Pain radiates to:  Head (obinna horn pattern). Pain timing: not time dependent.  Since onset symptoms are gradually improving.  Associated symptoms:  Headache and dizziness. Symptoms are exacerbated by lying down and rotating head (too much activity, chores, changing positions in bed)  and relieved by muscle relaxants and NSAID's (epsom salt baths).      Work activity restrictions: returns to work on Monday with lifting  restrictions.  Barriers include:  None as reported by patient.    Patient Health History         Pain is reported as 4/10 on pain scale.  General health as reported by patient is fair.  Pertinent medical history includes: asthma and overweight.   Red flags:  Pain at rest/night and severe headaches.             Current occupation is nursing assistant.   Primary job tasks include:  Computer work, lifting/carrying, operating a machine/assembly, prolonged standing, repetitive tasks and pushing/pulling.                                    Objective:  Standing Alignment:    Cervical/Thoracic:  Forward head  Shoulder/UE:  Rounded shoulders                                  Cervical/Thoracic Evaluation  Arom wnl cervical: loss in cervical retraction.     AROM:  AROM Cervical:    Flexion:            WNL  Extension:       Min loss - reports some dizziness  Rotation:         Left: min loss - pulls     Right: min loss - pulls  Side Bend:      Left: min loss - pulls     Right:  Min loss - pulls  AROM Thoracic:    Flexion:               Mod loss  Extension:          Min loss  Rotation:            Left: WNL - pulls     Right: WNL      Headaches: cervical  Cervical Myotomes:  normal                  DTR's:  not assessed          Cervical Dermatomes:  not assessed                    Cervical Palpation:    Tenderness present at Left:    Upper Trap; Levator; Erector Spinae (thoracic > cervical FRAN) and Suboccipitals  Tenderness not present at Left:   Scalenes  Tenderness present at Right:    Upper Trap; Levator; Erector Spinae and Suboccipitals  Tenderness not present at Right:      Scalenes    Cervical Stability/Joint Clearing:  not assessed                                                  General     ROS    Assessment/Plan:    Patient is a 27 year old female with cervical and thoracic complaints.    Patient has the following significant findings with corresponding treatment plan.                Diagnosis 1:  Neck and upper back pain  w/ associated headaches  Pain -  manual therapy, splint/taping/bracing/orthotics, self management, education, directional preference exercise and home program  Decreased ROM/flexibility - manual therapy, therapeutic exercise, therapeutic activity and home program  Decreased strength - therapeutic exercise, therapeutic activities and home program  Decreased function - therapeutic activities and home program  Impaired posture - neuro re-education, therapeutic activities and home program    Therapy Evaluation Codes:   Cumulative Therapy Evaluation is: Low complexity.    Previous and current functional limitations:  (See Goal Flow Sheet for this information)    Short term and Long term goals: (See Goal Flow Sheet for this information)     Communication ability:  Patient appears to be able to clearly communicate and understand verbal and written communication and follow directions correctly.  Treatment Explanation - The following has been discussed with the patient:   RX ordered/plan of care  Anticipated outcomes  Possible risks and side effects  This patient would benefit from PT intervention to resume normal activities.   Rehab potential is excellent.    Frequency:  1 X week, once daily  Duration:  for 4 weeks tapering to 2x/mo for 8 weeks  Discharge Plan:  Achieve all LTG.  Independent in home treatment program.  Reach maximal therapeutic benefit.    Please refer to the daily flowsheet for treatment today, total treatment time and time spent performing 1:1 timed codes.

## 2023-04-06 ENCOUNTER — THERAPY VISIT (OUTPATIENT)
Dept: PHYSICAL THERAPY | Facility: CLINIC | Age: 28
End: 2023-04-06
Attending: FAMILY MEDICINE
Payer: OTHER MISCELLANEOUS

## 2023-04-06 DIAGNOSIS — M54.6 ACUTE BILATERAL THORACIC BACK PAIN: Primary | ICD-10-CM

## 2023-04-06 PROCEDURE — 97110 THERAPEUTIC EXERCISES: CPT | Mod: GP | Performed by: PHYSICAL THERAPIST

## 2023-04-06 PROCEDURE — 97140 MANUAL THERAPY 1/> REGIONS: CPT | Mod: GP | Performed by: PHYSICAL THERAPIST

## 2023-04-15 ENCOUNTER — APPOINTMENT (OUTPATIENT)
Dept: GENERAL RADIOLOGY | Facility: CLINIC | Age: 28
End: 2023-04-15
Attending: EMERGENCY MEDICINE
Payer: MEDICAID

## 2023-04-15 ENCOUNTER — HOSPITAL ENCOUNTER (EMERGENCY)
Facility: CLINIC | Age: 28
Discharge: HOME | End: 2023-04-15
Attending: EMERGENCY MEDICINE | Admitting: EMERGENCY MEDICINE
Payer: MEDICAID

## 2023-04-15 VITALS
OXYGEN SATURATION: 97 % | SYSTOLIC BLOOD PRESSURE: 170 MMHG | RESPIRATION RATE: 18 BRPM | HEART RATE: 81 BPM | DIASTOLIC BLOOD PRESSURE: 98 MMHG | TEMPERATURE: 97.2 F

## 2023-04-15 DIAGNOSIS — S93.491A SPRAIN OF ANTERIOR TALOFIBULAR LIGAMENT OF RIGHT ANKLE, INITIAL ENCOUNTER: ICD-10-CM

## 2023-04-15 PROCEDURE — 250N000013 HC RX MED GY IP 250 OP 250 PS 637: Performed by: EMERGENCY MEDICINE

## 2023-04-15 PROCEDURE — 99283 EMERGENCY DEPT VISIT LOW MDM: CPT

## 2023-04-15 PROCEDURE — 73610 X-RAY EXAM OF ANKLE: CPT | Mod: RT

## 2023-04-15 RX ORDER — ACETAMINOPHEN 325 MG/1
975 TABLET ORAL ONCE
Status: COMPLETED | OUTPATIENT
Start: 2023-04-15 | End: 2023-04-15

## 2023-04-15 RX ORDER — IBUPROFEN 600 MG/1
600 TABLET, FILM COATED ORAL ONCE
Status: COMPLETED | OUTPATIENT
Start: 2023-04-15 | End: 2023-04-15

## 2023-04-15 RX ADMIN — IBUPROFEN 600 MG: 600 TABLET, FILM COATED ORAL at 14:42

## 2023-04-15 RX ADMIN — ACETAMINOPHEN 975 MG: 325 TABLET, FILM COATED ORAL at 14:42

## 2023-04-15 ASSESSMENT — ACTIVITIES OF DAILY LIVING (ADL): ADLS_ACUITY_SCORE: 35

## 2023-04-15 NOTE — ED TRIAGE NOTES
Pt arrives with c/o right ankle pain. Pt reports she mis-stepped and heard 3 cracks. Pt ambulatory in triage, no meds PTA.     Triage Assessment     Row Name 04/15/23 0188       Triage Assessment (Adult)    Airway WDL WDL       Respiratory WDL    Respiratory WDL WDL       Skin Circulation/Temperature WDL    Skin Circulation/Temperature WDL WDL       Cardiac WDL    Cardiac WDL WDL       Peripheral/Neurovascular WDL    Peripheral Neurovascular WDL WDL       Cognitive/Neuro/Behavioral WDL    Cognitive/Neuro/Behavioral WDL WDL

## 2023-04-15 NOTE — Clinical Note
Maria Alejandra Winters was seen and treated in our emergency department on 4/15/2023.  She may return to work on 04/24/2023.       If you have any questions or concerns, please don't hesitate to call.      Aleksandra Burnett MD Take measures to reduce stress levels.  Discuss such strategies with your .  Increase the amount of sleep.  Ensure healthy nutritious diet.  Keep caffeine intake to a minimum.

## 2023-04-15 NOTE — ED PROVIDER NOTES
History     Chief Complaint:  Ankle Pain       HPI   Maria Alejandra Winters is a 27 year old female who presents to the ED with right ankle pain.  Patient was on her way to work, and stepped onto a hill at her house.  She immediately felt pain, and heard a crack in her foot and ankle.  She was unable to bear weight secondary to pain.  She denies any numbness or tingling.  No pain in her knee.  She did not sustain any other injuries, nor did she fall or hit her head    Independent Historian:   None - Patient Only    Review of External Notes: none     ROS:  Review of Systems    Allergies:  No Known Allergies     Medications:    methylPREDNISolone (MEDROL DOSEPAK) 4 MG tablet therapy pack        Past Medical History:    Past Medical History:   Diagnosis Date     Asthma      Eczema      PCOS (polycystic ovarian syndrome)        Past Surgical History:    none    Family History:    Non contributory    Social History:   reports that she has never smoked. She has never used smokeless tobacco. She reports current alcohol use. She reports that she does not currently use drugs.  PCP: Misa Yousif   Accompanied by mother and friend.    Physical Exam     Patient Vitals for the past 24 hrs:   BP Temp Temp src Pulse Resp SpO2   04/15/23 1437 (!) 170/98 97.2  F (36.2  C) Temporal 81 18 97 %        Physical Exam  Gen:  Pleasant, appears stated age.    Eye:   Sclera non-injected.      Musculoskeletal:     Normal movement of all extremities without evidence for deficit.    Extremity Exam: Full AROM of all joints without pain except the following:  R LE  Inspection:  Mild swelling of lateral malleolus ankle; no bruising or laceration.  Palpation: lateral malleolus.  Tender to light palpation; medial malleolus nontender, no midfoot, or proximal 5th MT tenderness.  No tenderness over proximal fibula.  Calf soft and non-tender.  ROM: limited in ankle due to pain.  Full in knee without pain  Strength: wiggles toes, limited by pain  Sensation:  fine touch intact  Distal Pulses: intact 2+ DP, CR < 2 seconds      Skin:   Warm and dry.  No rash.    Neurologic:    Non-focal exam without asymmetric weakness or numbness.    Fluent speech.    Psychiatric:     Normal affect with appropriate interaction with examiner.        Emergency Department Course     Imaging:  XR Ankle Right G/E 3 Views   Final Result   IMPRESSION: Normal joint alignment. No fracture. Moderate soft tissue swelling in the anterior and lateral ankle. Mild degenerative arthritic spurring in the right ankle joint. Maintained joint spacing throughout the midfoot and hindfoot otherwise. Tiny    Achilles and plantar calcaneal enthesophytes.         Report per radiology    Emergency Department Course & Assessments:    Interventions:  Medications   acetaminophen (TYLENOL) tablet 975 mg (975 mg Oral $Given 4/15/23 1442)   ibuprofen (ADVIL/MOTRIN) tablet 600 mg (600 mg Oral $Given 4/15/23 1442)        Assessments:  3:21 PM I examined the patient.    Independent Interpretation (X-rays, CTs, rhythm strip):  I reviewed the radiographs, and did not see any fracture.  Soft tissue swelling to the lateral malleolus.    Consultations/Discussion of Management or Tests:  None        Social Determinants of Health affecting care:   None    Disposition:  The patient was discharged to home.     Impression & Plan      Medical Decision Making:  Maria Alejandra Winters is a 27 year old female who presents for evaluation of right ankle pain following a mis-step walking at home.  Signs and symptoms are consistent with an ankle sprain.  This involves ATFL of the lateral ankle by clinical exam. No signs of compartment syndrome, high ankle sprain, Maisonneuve fracture, LisFranc fracture, or neurovascular compromise.   Plan is for protected weightbearing, RICE treatment with ice 20 minutes on, 20 min off, ace wrap.  Radiographs are negative for fracture or joint dislocation.  Patient will advance weightbearing and follow-up with  primary in 2-3 days as needed.  The patient will begin gentle ROM exercises of the ankle. The patient will return for worsening symptoms including new numbness, weakness, or worsening pain.    Diagnosis:    ICD-10-CM    1. Sprain of anterior talofibular ligament of right ankle, initial encounter  S93.491A            Discharge Medications:  New Prescriptions    No medications on file        4/15/2023   Aleksandra Burnett MD Pepper, Tracy Lynn, MD  04/15/23 1522

## 2023-04-15 NOTE — Clinical Note
Maria Alejandra Winters was seen and treated in our emergency department on 4/15/2023.  She may return to work on 04/24/2023.       If you have any questions or concerns, please don't hesitate to call.      Aleksandra Burnett MD

## 2023-05-24 PROBLEM — M54.6 ACUTE BILATERAL THORACIC BACK PAIN: Status: RESOLVED | Noted: 2023-03-30 | Resolved: 2023-05-24

## 2023-05-24 NOTE — PROGRESS NOTES
DISCHARGE SUMMARY    Maria Alejandra Winters was seen 2 times for evaluation and treatment.  Patient did not return for further treatment and current status is unknown.  Due to short treatment duration, no objective or functional changes were made.  Please see goal flow sheet from episode noted date below and initial evaluation for further information.  Patient is discharged from therapy and therapy episode is resolved as of 05/24/23.      Linked Episodes   Type: Episode: Status: Noted: Resolved: Last update: Updated by:   PHYSICAL THERAPY upper back pain 3/30/23 Active 3/30/2023  4/6/2023  1:49 PM Aneta Guevara, PT      Comments:

## 2023-10-20 ENCOUNTER — HOSPITAL ENCOUNTER (EMERGENCY)
Facility: CLINIC | Age: 28
Discharge: HOME | End: 2023-10-20
Attending: EMERGENCY MEDICINE | Admitting: EMERGENCY MEDICINE

## 2023-10-20 VITALS
DIASTOLIC BLOOD PRESSURE: 83 MMHG | TEMPERATURE: 97.5 F | SYSTOLIC BLOOD PRESSURE: 131 MMHG | OXYGEN SATURATION: 96 % | RESPIRATION RATE: 18 BRPM | HEART RATE: 73 BPM

## 2023-10-20 DIAGNOSIS — R11.2 NAUSEA AND VOMITING, UNSPECIFIED VOMITING TYPE: ICD-10-CM

## 2023-10-20 DIAGNOSIS — R51.9 RIGHT SIDED FACIAL PAIN: ICD-10-CM

## 2023-10-20 LAB
ANION GAP SERPL CALCULATED.3IONS-SCNC: 9 MMOL/L (ref 7–15)
BASO+EOS+MONOS # BLD AUTO: ABNORMAL 10*3/UL
BASO+EOS+MONOS NFR BLD AUTO: ABNORMAL %
BASOPHILS # BLD AUTO: 0.1 10E3/UL (ref 0–0.2)
BASOPHILS NFR BLD AUTO: 1 %
BUN SERPL-MCNC: 9.6 MG/DL (ref 6–20)
CALCIUM SERPL-MCNC: 8.7 MG/DL (ref 8.6–10)
CHLORIDE SERPL-SCNC: 102 MMOL/L (ref 98–107)
CREAT SERPL-MCNC: 0.66 MG/DL (ref 0.51–0.95)
DEPRECATED HCO3 PLAS-SCNC: 26 MMOL/L (ref 22–29)
EGFRCR SERPLBLD CKD-EPI 2021: >90 ML/MIN/1.73M2
EOSINOPHIL # BLD AUTO: 0.1 10E3/UL (ref 0–0.7)
EOSINOPHIL NFR BLD AUTO: 1 %
ERYTHROCYTE [DISTWIDTH] IN BLOOD BY AUTOMATED COUNT: 13.6 % (ref 10–15)
FLUAV RNA SPEC QL NAA+PROBE: NEGATIVE
FLUBV RNA RESP QL NAA+PROBE: NEGATIVE
GLUCOSE SERPL-MCNC: 97 MG/DL (ref 70–99)
HCG SERPL QL: NEGATIVE
HCT VFR BLD AUTO: 41.9 % (ref 35–47)
HGB BLD-MCNC: 13.3 G/DL (ref 11.7–15.7)
HOLD SPECIMEN: NORMAL
HOLD SPECIMEN: NORMAL
IMM GRANULOCYTES # BLD: 0 10E3/UL
IMM GRANULOCYTES NFR BLD: 0 %
LYMPHOCYTES # BLD AUTO: 2.4 10E3/UL (ref 0.8–5.3)
LYMPHOCYTES NFR BLD AUTO: 21 %
MCH RBC QN AUTO: 25.7 PG (ref 26.5–33)
MCHC RBC AUTO-ENTMCNC: 31.7 G/DL (ref 31.5–36.5)
MCV RBC AUTO: 81 FL (ref 78–100)
MONOCYTES # BLD AUTO: 0.8 10E3/UL (ref 0–1.3)
MONOCYTES NFR BLD AUTO: 7 %
NEUTROPHILS # BLD AUTO: 8.3 10E3/UL (ref 1.6–8.3)
NEUTROPHILS NFR BLD AUTO: 70 %
NRBC # BLD AUTO: 0 10E3/UL
NRBC BLD AUTO-RTO: 0 /100
PLATELET # BLD AUTO: 276 10E3/UL (ref 150–450)
POTASSIUM SERPL-SCNC: 3.6 MMOL/L (ref 3.4–5.3)
RBC # BLD AUTO: 5.18 10E6/UL (ref 3.8–5.2)
RSV RNA SPEC NAA+PROBE: NEGATIVE
SARS-COV-2 RNA RESP QL NAA+PROBE: NEGATIVE
SODIUM SERPL-SCNC: 137 MMOL/L (ref 135–145)
WBC # BLD AUTO: 11.7 10E3/UL (ref 4–11)

## 2023-10-20 PROCEDURE — 96361 HYDRATE IV INFUSION ADD-ON: CPT

## 2023-10-20 PROCEDURE — 258N000003 HC RX IP 258 OP 636: Performed by: EMERGENCY MEDICINE

## 2023-10-20 PROCEDURE — 36415 COLL VENOUS BLD VENIPUNCTURE: CPT | Performed by: EMERGENCY MEDICINE

## 2023-10-20 PROCEDURE — 96374 THER/PROPH/DIAG INJ IV PUSH: CPT

## 2023-10-20 PROCEDURE — 84703 CHORIONIC GONADOTROPIN ASSAY: CPT | Performed by: EMERGENCY MEDICINE

## 2023-10-20 PROCEDURE — 80048 BASIC METABOLIC PNL TOTAL CA: CPT | Performed by: EMERGENCY MEDICINE

## 2023-10-20 PROCEDURE — 85025 COMPLETE CBC W/AUTO DIFF WBC: CPT | Performed by: EMERGENCY MEDICINE

## 2023-10-20 PROCEDURE — 87637 SARSCOV2&INF A&B&RSV AMP PRB: CPT | Performed by: EMERGENCY MEDICINE

## 2023-10-20 PROCEDURE — 250N000011 HC RX IP 250 OP 636: Mod: JZ | Performed by: EMERGENCY MEDICINE

## 2023-10-20 PROCEDURE — 85014 HEMATOCRIT: CPT | Performed by: EMERGENCY MEDICINE

## 2023-10-20 PROCEDURE — 99284 EMERGENCY DEPT VISIT MOD MDM: CPT | Mod: 25

## 2023-10-20 PROCEDURE — 85041 AUTOMATED RBC COUNT: CPT | Performed by: EMERGENCY MEDICINE

## 2023-10-20 RX ORDER — ONDANSETRON 4 MG/1
4 TABLET, ORALLY DISINTEGRATING ORAL EVERY 8 HOURS PRN
Qty: 12 TABLET | Refills: 0 | Status: SHIPPED | OUTPATIENT
Start: 2023-10-20

## 2023-10-20 RX ORDER — ONDANSETRON 2 MG/ML
4 INJECTION INTRAMUSCULAR; INTRAVENOUS ONCE
Status: COMPLETED | OUTPATIENT
Start: 2023-10-20 | End: 2023-10-20

## 2023-10-20 RX ADMIN — ONDANSETRON 4 MG: 2 INJECTION INTRAMUSCULAR; INTRAVENOUS at 19:36

## 2023-10-20 RX ADMIN — SODIUM CHLORIDE 1000 ML: 9 INJECTION, SOLUTION INTRAVENOUS at 19:36

## 2023-10-20 ASSESSMENT — ACTIVITIES OF DAILY LIVING (ADL): ADLS_ACUITY_SCORE: 35

## 2023-10-20 NOTE — Clinical Note
Maria Alejandra Winters was seen and treated in our emergency department on 10/20/2023.  She may return to work on 10/23/2023.       If you have any questions or concerns, please don't hesitate to call.      Saray Wang, KEYON

## 2023-10-20 NOTE — ED TRIAGE NOTES
X1 emesis with R sided facial pain with hot flashes and light headedness starting today. Denies abd pain or urinary symptoms. ABCs intact A&Ox4 no sick contacts

## 2023-10-21 NOTE — ED PROVIDER NOTES
"  History     Chief Complaint:  Nausea & Vomiting       The history is provided by the patient.      Maria Alejandra Winters is a 28 year old female with history of hypertension who present with vomiting and right sided facial pain. Patient reports having multiple episodes of vomiting when she was at work. She states she \"just doesn't feel good\". No abdominal pain. Maria Alejandra mentions that she noticed the right sided facial pain two days ago. She reports when she put on her glasses she noticed the pressure on her face. Patient states she experiences multiple episodes of migraines but believes this is not associated. She also mentions symptoms of runny nose and congestion. Denies fever, cough, sore throat, vision changes, hematuria, or dysuria. Patient denies having pain in the ear, eye, jaw, chest, or mouth. No daily medication or major surgeries. She has not taken ay medication for her symptoms. Maria Alejandra states she has been around her friend which had COVID.     Independent Historian:   None - Patient Only    Review of External Notes:   Reviewed emergency room visit from 9/26/2023 when patient was seen for neck pain and had a negative CT.  She did have a dental carry and was started on Augmentin for possible dental infection.      Medications:    Medrol dosepak       Past Medical History:    Asthma  Eczema  Polycystic ovarian syndrome  Myopia   Obesity   Papanicolaou smear   Hypertension     Pneumonia   Varicella-immune   Scoliosis   Tinea capitis   Speech delay   Prediabetes   Amenorrhea     Past Surgical History:    Bronchoscopy      Physical Exam   Patient Vitals for the past 24 hrs:   BP Temp Pulse Resp SpO2   10/20/23 1940 131/83 -- 73 -- 96 %   10/20/23 1727 (!) 140/82 97.5  F (36.4  C) 96 18 100 %        Physical Exam  Vitals and nursing note reviewed.   Constitutional:       General: She is not in acute distress.     Appearance: She is not ill-appearing.   HENT:      Head: Normocephalic and atraumatic.      Right Ear: " External ear normal.      Left Ear: External ear normal.      Nose: Nose normal. No nasal tenderness, congestion or rhinorrhea.      Mouth/Throat:      Mouth: Mucous membranes are moist.      Dentition: Dental caries present. No dental tenderness or dental abscesses.   Eyes:      Extraocular Movements: Extraocular movements intact.      Conjunctiva/sclera: Conjunctivae normal.      Pupils: Pupils are equal, round, and reactive to light.   Cardiovascular:      Rate and Rhythm: Normal rate and regular rhythm.      Heart sounds: No murmur heard.  Pulmonary:      Effort: Pulmonary effort is normal. No respiratory distress.      Breath sounds: Normal breath sounds. No wheezing, rhonchi or rales.   Abdominal:      General: Abdomen is flat. Bowel sounds are normal. There is no distension.      Palpations: Abdomen is soft.      Tenderness: There is no abdominal tenderness. There is no guarding or rebound.   Musculoskeletal:         General: No deformity or signs of injury.      Cervical back: Normal range of motion and neck supple.   Skin:     General: Skin is warm and dry.      Findings: No rash.   Neurological:      Mental Status: She is alert and oriented to person, place, and time.   Psychiatric:         Behavior: Behavior normal.           Emergency Department Course       Imaging:  No orders to display          Laboratory:  Labs Ordered and Resulted from Time of ED Arrival to Time of ED Departure   CBC WITH PLATELETS AND DIFFERENTIAL - Abnormal       Result Value    WBC Count 11.7 (*)     RBC Count 5.18      Hemoglobin 13.3      Hematocrit 41.9      MCV 81      MCH 25.7 (*)     MCHC 31.7      RDW 13.6      Platelet Count 276      % Neutrophils 70      % Lymphocytes 21      % Monocytes 7      Mids % (Monos, Eos, Basos)        % Eosinophils 1      % Basophils 1      % Immature Granulocytes 0      NRBCs per 100 WBC 0      Absolute Neutrophils 8.3      Absolute Lymphocytes 2.4      Absolute Monocytes 0.8      Mids Abs  (Monos, Eos, Basos)        Absolute Eosinophils 0.1      Absolute Basophils 0.1      Absolute Immature Granulocytes 0.0      Absolute NRBCs 0.0     BASIC METABOLIC PANEL - Normal    Sodium 137      Potassium 3.6      Chloride 102      Carbon Dioxide (CO2) 26      Anion Gap 9      Urea Nitrogen 9.6      Creatinine 0.66      GFR Estimate >90      Calcium 8.7      Glucose 97     HCG QUALITATIVE PREGNANCY - Normal    hCG Serum Qualitative Negative     INFLUENZA A/B, RSV, & SARS-COV2 PCR - Normal    Influenza A PCR Negative      Influenza B PCR Negative      RSV PCR Negative      SARS CoV2 PCR Negative          Emergency Department Course & Assessments:        Interventions:  Medications   sodium chloride 0.9% BOLUS 1,000 mL (0 mLs Intravenous Stopped 10/20/23 2124)   ondansetron (ZOFRAN) injection 4 mg (4 mg Intravenous $Given 10/20/23 1936)        Assessments:   I obtained history and examined the patient as noted above.    I rechecked and updated the patient.     Independent Interpretation (X-rays, CTs, rhythm strip):  None    Consultations/Discussion of Management or Tests:  None        Social Determinants of Health affecting care:   None    Disposition:  The patient was discharged to home.     Impression & Plan        Medical Decision Makin-year-old female presenting with nausea and vomiting that started today.  She is also had right-sided facial pain for the past few days and some diarrhea couple days ago.  Suspect that this may be a viral illness.  She has no abdominal pain and a benign abdominal exam.  She does have some dental caries so the pain could be referred pain from that, but there is no obvious dental abscess and no significant swelling or tenderness.  I have low suspicion for acute sinusitis.  She did have recent exposure to COVID so we will test for that.  Otherwise her labs are unremarkable with only a very mild leukocytosis but otherwise normal.  We will give IV fluids and antiemetics  for symptomatic care.      Diagnosis:    ICD-10-CM    1. Nausea and vomiting, unspecified vomiting type  R11.2       2. Right sided facial pain  R51.9            Discharge Medications:  Discharge Medication List as of 10/20/2023  9:25 PM        START taking these medications    Details   ondansetron (ZOFRAN ODT) 4 MG ODT tab Take 1 tablet (4 mg) by mouth every 8 hours as needed for nausea, Disp-12 tablet, R-0, E-Prescribe                Scribe Disclosure:  IGhassan, am serving as a scribe at 7:13 PM on 10/20/2023 to document services personally performed by Po Peralta MD based on my observations and the provider's statements to me.   10/20/2023   Po Peralta MD Goodwin, Shaun M, MD  10/21/23 0105

## 2023-12-19 ENCOUNTER — HOSPITAL ENCOUNTER (EMERGENCY)
Facility: CLINIC | Age: 28
Discharge: HOME | End: 2023-12-19
Attending: PHYSICIAN ASSISTANT | Admitting: PHYSICIAN ASSISTANT

## 2023-12-19 ENCOUNTER — APPOINTMENT (OUTPATIENT)
Dept: GENERAL RADIOLOGY | Facility: CLINIC | Age: 28
End: 2023-12-19
Attending: PHYSICIAN ASSISTANT

## 2023-12-19 VITALS
TEMPERATURE: 97.5 F | WEIGHT: 293 LBS | RESPIRATION RATE: 21 BRPM | HEART RATE: 81 BPM | OXYGEN SATURATION: 97 % | BODY MASS INDEX: 49.59 KG/M2 | SYSTOLIC BLOOD PRESSURE: 120 MMHG | DIASTOLIC BLOOD PRESSURE: 78 MMHG

## 2023-12-19 DIAGNOSIS — R07.9 CHEST PAIN, UNSPECIFIED TYPE: ICD-10-CM

## 2023-12-19 DIAGNOSIS — E87.6 HYPOKALEMIA: ICD-10-CM

## 2023-12-19 LAB
ALBUMIN SERPL BCG-MCNC: 3.9 G/DL (ref 3.5–5.2)
ALP SERPL-CCNC: 72 U/L (ref 40–150)
ALT SERPL W P-5'-P-CCNC: 31 U/L (ref 0–50)
ANION GAP SERPL CALCULATED.3IONS-SCNC: 11 MMOL/L (ref 7–15)
AST SERPL W P-5'-P-CCNC: 24 U/L (ref 0–45)
ATRIAL RATE - MUSE: 79 BPM
BASOPHILS # BLD AUTO: 0.1 10E3/UL (ref 0–0.2)
BASOPHILS NFR BLD AUTO: 1 %
BILIRUB SERPL-MCNC: 0.6 MG/DL
BUN SERPL-MCNC: 14.6 MG/DL (ref 6–20)
CALCIUM SERPL-MCNC: 8.2 MG/DL (ref 8.6–10)
CHLORIDE SERPL-SCNC: 102 MMOL/L (ref 98–107)
CREAT SERPL-MCNC: 0.68 MG/DL (ref 0.51–0.95)
D DIMER PPP FEU-MCNC: 0.27 UG/ML FEU (ref 0–0.5)
DEPRECATED HCO3 PLAS-SCNC: 27 MMOL/L (ref 22–29)
DIASTOLIC BLOOD PRESSURE - MUSE: NORMAL MMHG
EGFRCR SERPLBLD CKD-EPI 2021: >90 ML/MIN/1.73M2
EOSINOPHIL # BLD AUTO: 0.3 10E3/UL (ref 0–0.7)
EOSINOPHIL NFR BLD AUTO: 3 %
ERYTHROCYTE [DISTWIDTH] IN BLOOD BY AUTOMATED COUNT: 13.5 % (ref 10–15)
GLUCOSE SERPL-MCNC: 97 MG/DL (ref 70–99)
HCG SERPL QL: NEGATIVE
HCT VFR BLD AUTO: 39.6 % (ref 35–47)
HGB BLD-MCNC: 12.4 G/DL (ref 11.7–15.7)
IMM GRANULOCYTES # BLD: 0 10E3/UL
IMM GRANULOCYTES NFR BLD: 0 %
INTERPRETATION ECG - MUSE: NORMAL
LIPASE SERPL-CCNC: 31 U/L (ref 13–60)
LYMPHOCYTES # BLD AUTO: 3 10E3/UL (ref 0.8–5.3)
LYMPHOCYTES NFR BLD AUTO: 34 %
MCH RBC QN AUTO: 25.3 PG (ref 26.5–33)
MCHC RBC AUTO-ENTMCNC: 31.3 G/DL (ref 31.5–36.5)
MCV RBC AUTO: 81 FL (ref 78–100)
MONOCYTES # BLD AUTO: 0.7 10E3/UL (ref 0–1.3)
MONOCYTES NFR BLD AUTO: 8 %
NEUTROPHILS # BLD AUTO: 4.6 10E3/UL (ref 1.6–8.3)
NEUTROPHILS NFR BLD AUTO: 54 %
NRBC # BLD AUTO: 0 10E3/UL
NRBC BLD AUTO-RTO: 0 /100
P AXIS - MUSE: 64 DEGREES
PLATELET # BLD AUTO: 244 10E3/UL (ref 150–450)
POTASSIUM SERPL-SCNC: 3.2 MMOL/L (ref 3.4–5.3)
PR INTERVAL - MUSE: 148 MS
PROT SERPL-MCNC: 7 G/DL (ref 6.4–8.3)
QRS DURATION - MUSE: 80 MS
QT - MUSE: 392 MS
QTC - MUSE: 449 MS
R AXIS - MUSE: 2 DEGREES
RBC # BLD AUTO: 4.91 10E6/UL (ref 3.8–5.2)
SODIUM SERPL-SCNC: 140 MMOL/L (ref 135–145)
SYSTOLIC BLOOD PRESSURE - MUSE: NORMAL MMHG
T AXIS - MUSE: -5 DEGREES
TROPONIN T SERPL HS-MCNC: <6 NG/L
TROPONIN T SERPL HS-MCNC: <6 NG/L
VENTRICULAR RATE- MUSE: 79 BPM
WBC # BLD AUTO: 8.7 10E3/UL (ref 4–11)

## 2023-12-19 PROCEDURE — 85025 COMPLETE CBC W/AUTO DIFF WBC: CPT | Performed by: PHYSICIAN ASSISTANT

## 2023-12-19 PROCEDURE — 84484 ASSAY OF TROPONIN QUANT: CPT | Mod: 91 | Performed by: PHYSICIAN ASSISTANT

## 2023-12-19 PROCEDURE — 71046 X-RAY EXAM CHEST 2 VIEWS: CPT

## 2023-12-19 PROCEDURE — 99285 EMERGENCY DEPT VISIT HI MDM: CPT | Mod: 25

## 2023-12-19 PROCEDURE — 85379 FIBRIN DEGRADATION QUANT: CPT | Performed by: PHYSICIAN ASSISTANT

## 2023-12-19 PROCEDURE — 36415 COLL VENOUS BLD VENIPUNCTURE: CPT | Performed by: PHYSICIAN ASSISTANT

## 2023-12-19 PROCEDURE — 250N000013 HC RX MED GY IP 250 OP 250 PS 637: Performed by: PHYSICIAN ASSISTANT

## 2023-12-19 PROCEDURE — 93005 ELECTROCARDIOGRAM TRACING: CPT

## 2023-12-19 PROCEDURE — 83690 ASSAY OF LIPASE: CPT | Performed by: PHYSICIAN ASSISTANT

## 2023-12-19 PROCEDURE — 84703 CHORIONIC GONADOTROPIN ASSAY: CPT | Performed by: PHYSICIAN ASSISTANT

## 2023-12-19 PROCEDURE — 80053 COMPREHEN METABOLIC PANEL: CPT | Performed by: PHYSICIAN ASSISTANT

## 2023-12-19 RX ORDER — MAGNESIUM HYDROXIDE/ALUMINUM HYDROXICE/SIMETHICONE 120; 1200; 1200 MG/30ML; MG/30ML; MG/30ML
15 SUSPENSION ORAL ONCE
Status: COMPLETED | OUTPATIENT
Start: 2023-12-19 | End: 2023-12-19

## 2023-12-19 RX ADMIN — ALUMINUM HYDROXIDE, MAGNESIUM HYDROXIDE, AND SIMETHICONE 15 ML: 200; 200; 20 SUSPENSION ORAL at 09:48

## 2023-12-19 ASSESSMENT — ACTIVITIES OF DAILY LIVING (ADL): ADLS_ACUITY_SCORE: 35

## 2023-12-19 NOTE — ED TRIAGE NOTES
"Pt c/o midsternal/epigastric chest pain. Started 1 hour prior to arrival. Does not radiate. Pt reports chest pain developed, \"right after I was picking at a bump under my left breast.\" Denies SOB or dizziness. Denies any major medical past history. A&OX4.      Triage Assessment (Adult)       Row Name 12/19/23 0808          Triage Assessment    Airway WDL WDL        Respiratory WDL    Respiratory WDL WDL        Skin Circulation/Temperature WDL    Skin Circulation/Temperature WDL WDL        Cardiac WDL    Cardiac WDL X;chest pain        Chest Pain Assessment    Chest Pain Location epigastric;midsternal        Peripheral/Neurovascular WDL    Peripheral Neurovascular WDL WDL        Cognitive/Neuro/Behavioral WDL    Cognitive/Neuro/Behavioral WDL WDL                     "

## 2023-12-19 NOTE — ED PROVIDER NOTES
History     Chief Complaint:  Chest Pain       HPI   Maria Alejandra Winters is a 28 year old female who presents with substernal chest pain.  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  Patient states it starts in her lower chest and radiates upwards.  Does not radiate to the right or left side.  No diaphoresis, nausea, or vomiting.  She has no cardiac or pulmonary history.  She is not concerned for pregnancy today.  Patient is also concerned that she has a cutaneous lump on her chest wall.  No redness, warmth, or discharge from lump.  Patient notes she has a history of acid reflux but otherwise no chronic health issues.  She reports no history of PE or DVT.      Independent Historian:   None - Patient Only    Review of External Notes:   None       Medications:    methylPREDNISolone (MEDROL DOSEPAK) 4 MG tablet therapy pack  ondansetron (ZOFRAN ODT) 4 MG ODT tab        Past Medical History:    Past Medical History:   Diagnosis Date    Asthma     Eczema     PCOS (polycystic ovarian syndrome)        Past Surgical History:    No past surgical history on file.     Physical Exam   Patient Vitals for the past 24 hrs:   BP Temp Temp src Pulse Resp SpO2 Weight   12/19/23 1104 120/78 -- -- 81 21 97 % --   12/19/23 1049 119/67 -- -- 86 20 98 % --   12/19/23 1034 126/81 -- -- 74 22 97 % --   12/19/23 1004 116/66 -- -- 84 (!) 9 -- --   12/19/23 0953 125/84 -- -- 80 19 98 % --   12/19/23 0908 123/75 -- -- 82 -- 98 % --   12/19/23 0807 (!) 145/87 97.5  F (36.4  C) Oral 79 16 100 % 135.2 kg (298 lb)        Physical Exam  Constitutional: Alert, attentive, GCS 15  HENT:    Nose: Nose normal.    Mouth/Throat: Oropharynx is clear, mucous membranes are moist   Eyes: EOM are normal.   CV: regular rate and rhythm; no murmurs, rubs or gallups. No significant lower extremity edema.  Chest: Effort normal and breath sounds normal.   GI:  There is no  tenderness. No distension. Normal bowel sounds  MSK: Normal range of motion.   Neurological: Alert, attentive  Skin: Skin is warm and dry.      Emergency Department Course     ECG results from 12/19/23   EKG 12 lead     Value    Systolic Blood Pressure     Diastolic Blood Pressure     Ventricular Rate 79    Atrial Rate 79    MI Interval 148    QRS Duration 80        QTc 449    P Axis 64    R AXIS 2    T Axis -5    Interpretation ECG      Sinus rhythm  Normal ECG  When compared with ECG of 27-SEP-2022 07:43,  No significant change was found  Unconfirmed report - interpretation of this ECG is computer generated - see medical record for final interpretation  Confirmed by - EMERGENCY ROOM, PHYSICIAN ([id number]),  RENA JIANG ([id number]) on 12/19/2023 10:08:36 AM           Imaging:  Chest XR,  PA & LAT   Final Result   IMPRESSION: Normal.      DENISE MINOR MD            SYSTEM ID:  [id number]             Laboratory:  Labs Ordered and Resulted from Time of ED Arrival to Time of ED Departure   COMPREHENSIVE METABOLIC PANEL - Abnormal       Result Value    Sodium 140      Potassium 3.2 (*)     Carbon Dioxide (CO2) 27      Anion Gap 11      Urea Nitrogen 14.6      Creatinine 0.68      GFR Estimate >90      Calcium 8.2 (*)     Chloride 102      Glucose 97      Alkaline Phosphatase 72      AST 24      ALT 31      Protein Total 7.0      Albumin 3.9      Bilirubin Total 0.6     CBC WITH PLATELETS AND DIFFERENTIAL - Abnormal    WBC Count 8.7      RBC Count 4.91      Hemoglobin 12.4      Hematocrit 39.6      MCV 81      MCH 25.3 (*)     MCHC 31.3 (*)     RDW 13.5      Platelet Count 244      % Neutrophils 54      % Lymphocytes 34      % Monocytes 8      % Eosinophils 3      % Basophils 1      % Immature Granulocytes 0      NRBCs per 100 WBC 0      Absolute Neutrophils 4.6      Absolute Lymphocytes 3.0      Absolute Monocytes 0.7      Absolute Eosinophils 0.3      Absolute Basophils 0.1      Absolute Immature Granulocytes 0.0       Absolute NRBCs 0.0     LIPASE - Normal    Lipase 31     HCG QUALITATIVE PREGNANCY - Normal    hCG Serum Qualitative Negative     TROPONIN T, HIGH SENSITIVITY - Normal    Troponin T, High Sensitivity <6     TROPONIN T, HIGH SENSITIVITY - Normal    Troponin T, High Sensitivity <6     D DIMER QUANTITATIVE - Normal    D-Dimer Quantitative 0.27            Emergency Department Course & Assessments:             Interventions:  Medications   alum & mag hydroxide-simethicone (MAALOX) suspension 15 mL (15 mLs Oral $Given 12/19/23 0948)        Assessments:  0930  I obtained patient history and performed physical examination as above.      Independent Interpretation (X-rays, CTs, rhythm strip):  No acute process on chest x-ray.    Consultations/Discussion of Management or Tests:  None        Social Determinants of Health affecting care:   None    Disposition:  The patient was discharged to home.     Impression & Plan    CMS Diagnoses: None    Medical Decision Making:  This patient presents to the ED today with chest pain described as central chest pain that started this morning.  She is normotensive, not hypoxic, afebrile.  Physical examination is unremarkable.  Differential includes acute coronary syndrome, pulmonary embolism, aortic dissection.  Mild hypokalemia evident however labs are otherwise reassuring today.  EKG shows normal sinus rhythm without evidence of ischemia and both initial and delta troponins are undetectable. D-dimer is within normal limits making PE/DVT unlikely. No through to the back pain or widened mediastinum on CXR and dissection also unlikely.   I also considered pneumoniae, pneumothorax, pericarditis, pleurisy, esophageal spasm. No serious etiology for the chest pain were detected today during this visit.  HEART score is 0 with 0.9-1.7% risk for 30-day major adverse cardiac event and is appropriate for outpatient follow-up.  I suspect this is likely related either to a musculoskeletal etiology or  possible heartburn.  She was given GI cocktail without improvement.  I offered potassium supplementation however this was declined by patient.  She may try potassium rich foods over the next several days and follow-up with primary care for recheck within 1 week.  The patient was instructed to return to the ED with new or worse symptoms and follow up with their primary care provider in 1-3 days for ongoing evaluation and management.  The patient should return if increasing pain, shortness of breath or fever.    Diagnosis:    ICD-10-CM    1. Chest pain, unspecified type  R07.9       2. Hypokalemia  E87.6            Discharge Medications:  Discharge Medication List as of 12/19/2023 11:44 AM               12/19/2023   Eloise Huang PA-C Steinbrueck, Emily, PA-C  12/19/23 1512

## 2023-12-19 NOTE — LETTER
December 19, 2023      To Whom It May Concern:      Maria Alejandra Winters was seen in our Emergency Department today, 12/19/23.  I expect her condition to improve over the next 2days.  She may return to work/school when improved.    Sincerely,        Cara Vitale RN

## 2024-06-21 ENCOUNTER — TRANSCRIBE ORDERS (OUTPATIENT)
Dept: OTHER | Age: 29
End: 2024-06-21

## 2024-06-21 DIAGNOSIS — E66.813: ICD-10-CM

## 2024-06-21 DIAGNOSIS — L30.9 ECZEMA: Primary | ICD-10-CM

## 2024-06-21 DIAGNOSIS — E66.01: ICD-10-CM

## 2024-07-05 ENCOUNTER — HOSPITAL ENCOUNTER (EMERGENCY)
Facility: CLINIC | Age: 29
Discharge: HOME | End: 2024-07-05
Attending: STUDENT IN AN ORGANIZED HEALTH CARE EDUCATION/TRAINING PROGRAM | Admitting: STUDENT IN AN ORGANIZED HEALTH CARE EDUCATION/TRAINING PROGRAM
Payer: MEDICAID

## 2024-07-05 VITALS
HEART RATE: 86 BPM | BODY MASS INDEX: 48.82 KG/M2 | DIASTOLIC BLOOD PRESSURE: 94 MMHG | TEMPERATURE: 97.3 F | RESPIRATION RATE: 20 BRPM | SYSTOLIC BLOOD PRESSURE: 142 MMHG | WEIGHT: 293 LBS | OXYGEN SATURATION: 100 % | HEIGHT: 65 IN

## 2024-07-05 DIAGNOSIS — J02.9 SORE THROAT: ICD-10-CM

## 2024-07-05 DIAGNOSIS — R42 LIGHTHEADEDNESS: ICD-10-CM

## 2024-07-05 LAB
FLUAV RNA SPEC QL NAA+PROBE: NEGATIVE
FLUBV RNA RESP QL NAA+PROBE: NEGATIVE
GROUP A STREP BY PCR: NOT DETECTED
RSV RNA SPEC NAA+PROBE: NEGATIVE
SARS-COV-2 RNA RESP QL NAA+PROBE: NEGATIVE

## 2024-07-05 PROCEDURE — 87637 SARSCOV2&INF A&B&RSV AMP PRB: CPT | Performed by: EMERGENCY MEDICINE

## 2024-07-05 PROCEDURE — 87651 STREP A DNA AMP PROBE: CPT | Performed by: STUDENT IN AN ORGANIZED HEALTH CARE EDUCATION/TRAINING PROGRAM

## 2024-07-05 PROCEDURE — 99284 EMERGENCY DEPT VISIT MOD MDM: CPT

## 2024-07-05 PROCEDURE — 87637 SARSCOV2&INF A&B&RSV AMP PRB: CPT | Performed by: STUDENT IN AN ORGANIZED HEALTH CARE EDUCATION/TRAINING PROGRAM

## 2024-07-05 PROCEDURE — 250N000013 HC RX MED GY IP 250 OP 250 PS 637: Performed by: EMERGENCY MEDICINE

## 2024-07-05 PROCEDURE — 93005 ELECTROCARDIOGRAM TRACING: CPT

## 2024-07-05 PROCEDURE — 87651 STREP A DNA AMP PROBE: CPT | Performed by: EMERGENCY MEDICINE

## 2024-07-05 RX ORDER — ACETAMINOPHEN 325 MG/1
975 TABLET ORAL ONCE
Status: COMPLETED | OUTPATIENT
Start: 2024-07-05 | End: 2024-07-05

## 2024-07-05 RX ADMIN — ACETAMINOPHEN 975 MG: 325 TABLET, FILM COATED ORAL at 17:10

## 2024-07-05 ASSESSMENT — COLUMBIA-SUICIDE SEVERITY RATING SCALE - C-SSRS
2. HAVE YOU ACTUALLY HAD ANY THOUGHTS OF KILLING YOURSELF IN THE PAST MONTH?: NO
6. HAVE YOU EVER DONE ANYTHING, STARTED TO DO ANYTHING, OR PREPARED TO DO ANYTHING TO END YOUR LIFE?: NO
1. IN THE PAST MONTH, HAVE YOU WISHED YOU WERE DEAD OR WISHED YOU COULD GO TO SLEEP AND NOT WAKE UP?: NO

## 2024-07-05 ASSESSMENT — ACTIVITIES OF DAILY LIVING (ADL): ADLS_ACUITY_SCORE: 33

## 2024-07-05 NOTE — Clinical Note
Maria Alejandra Winters was seen and treated in our emergency department on 7/5/2024.  She may return to work on 07/06/2024.       If you have any questions or concerns, please don't hesitate to call.      Vijay Ferraro MD

## 2024-07-06 NOTE — ED PROVIDER NOTES
"  Emergency Department Note      History of Present Illness     Chief Complaint   Dizziness and Generalized Body Aches      HPI   Maria Alejandra Winters is a 29 year old female who presents to the ED for an evaluation of sore throat and dizziness. The patient reports that she woke this morning feeling lethargic. She notes that she wasn't feeling well Monday an Tuesday but was feeling better on Thursday. She endorses feeling  dizzy like she is about to pass out, having hot flashes and having shortness of breath. She endorses having excess stool, but not diarrhea. Denies any fever, vomiting, abdominal pain and nausea. No urinary symptoms. 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 No palpitation. No episode of passing out.  She currently is feeling improved.  Her head feels well.  Is not actively short of breath.      Independent Historian   Patient, as per HPI.     Review of External Notes   None.     Past Medical History     Medical History and Problem List   Asthma   PCOS       Medications   The patient is not currently taking any prescribed medications.     Physical Exam     Patient Vitals for the past 24 hrs:   BP Temp Temp src Pulse Resp SpO2 Height Weight   07/05/24 1705 (!) 142/94 97.3  F (36.3  C) Temporal 86 20 100 % 1.651 m (5' 5\") 142.1 kg (313 lb 4.4 oz)     Physical Exam  GENERAL: Patient well-appearing  HEAD: Atraumatic.  EYES: Anicteric. PERRL  NOSE: No active bleeding  MOUTH: Moist mucosa  THROAT: Patent airway.  No erythema or exudate.  NECK: No rigidity  CV: RRR, no murmurs, rubs or gallops  PULM: CTAB with good aeration; no retractions, rales, rhonchi, or wheezing  ABD: Soft, nontender, nondistended, no guarding, no peritoneal signs   DERM: No rash. Skin warm and dry  EXTREMITY: Moving all extremities without difficulty. No calf tenderness or peripheral edema  VASCULAR: Symmetric pulses bilaterally  NEURO: A,Ox3. CN 2-12 fully intact. Strength 5/5 bilateral LE/UE. Sensation fully intact to light " touch symmetrically bilateral LE/UE. Normal finger-to-nose and heel to shin. No ataxia.    Diagnostics     Lab Results   Labs Ordered and Resulted from Time of ED Arrival to Time of ED Departure   INFLUENZA A/B, RSV, & SARS-COV2 PCR - Normal       Result Value    Influenza A PCR Negative      Influenza B PCR Negative      RSV PCR Negative      SARS CoV2 PCR Negative     GROUP A STREPTOCOCCUS PCR THROAT SWAB - Normal    Group A strep by PCR Not Detected         Imaging   No orders to display       EKG   ECG taken at 1726, ECG read at 1916  Normal sinus rhythm   Normal ECG   Rate 82 bpm. DE interval 142 ms. QRS duration 78 ms. QT/QTc 374/436 ms. P-R-T axes 60 -2 -23.    Independent Interpretation   None    ED Course      Medications Administered   Medications   acetaminophen (TYLENOL) tablet 975 mg (975 mg Oral $Given 7/5/24 1710)       Procedures   Procedures     Discussion of Management   None    ED Course   ED Course as of 07/05/24 1950 Fri Jul 05, 2024 1927 I have obtained history and evaluated the patient.          Optional/Additional Documentation  None    Medical Decision Making / Diagnosis   WILDA Winters is a 29 year old female presenting with lightheadedness and sore throat.  Symptoms appear to be viral in nature.  Differential diagnosis-considered COVID, flu, strep, among many others.  Viral and strep testing negative.  Vital signs reassuring.  ECG unremarkable.  Patient is well-appearing and feeling improved.  Unremarkable neurologic exam.  Do not see indication for further labs or imaging.  Recommended hydration and rest.  Patient was evaluated for acute medical emergencies. Based on my clinical assessment, I do not think any further acute management or work-up is required.  Patient stable for discharge. Given strict return precautions. All questions answered. Patient content with plan. Recommended PCP follow-up in 2-3 days.      Disposition   The patient was discharged.     Diagnosis      ICD-10-CM    1. Lightheadedness  R42       2. Sore throat  J02.9            Discharge Medications   Discharge Medication List as of 7/5/2024  7:36 PM            Scribe Disclosure:  I, Giuliana Ortiz, am serving as a scribe at 7:49 PM on 7/5/2024 to document services personally performed by No att. providers found based on my observations and the provider's statements to me.        Vijay Ferraro MD  07/05/24 2015

## 2024-07-08 LAB
ATRIAL RATE - MUSE: 82 BPM
DIASTOLIC BLOOD PRESSURE - MUSE: NORMAL MMHG
INTERPRETATION ECG - MUSE: NORMAL
P AXIS - MUSE: 60 DEGREES
PR INTERVAL - MUSE: 142 MS
QRS DURATION - MUSE: 78 MS
QT - MUSE: 374 MS
QTC - MUSE: 436 MS
R AXIS - MUSE: -2 DEGREES
SYSTOLIC BLOOD PRESSURE - MUSE: NORMAL MMHG
T AXIS - MUSE: -23 DEGREES
VENTRICULAR RATE- MUSE: 82 BPM

## 2024-08-27 ENCOUNTER — HOSPITAL ENCOUNTER (EMERGENCY)
Facility: CLINIC | Age: 29
Discharge: HOME | End: 2024-08-27
Attending: EMERGENCY MEDICINE | Admitting: EMERGENCY MEDICINE
Payer: MEDICAID

## 2024-08-27 VITALS
SYSTOLIC BLOOD PRESSURE: 146 MMHG | HEART RATE: 108 BPM | WEIGHT: 293 LBS | BODY MASS INDEX: 47.09 KG/M2 | OXYGEN SATURATION: 98 % | DIASTOLIC BLOOD PRESSURE: 80 MMHG | HEIGHT: 66 IN | RESPIRATION RATE: 18 BRPM | TEMPERATURE: 98.4 F

## 2024-08-27 DIAGNOSIS — H65.93 MIDDLE EAR EFFUSION, BILATERAL: ICD-10-CM

## 2024-08-27 DIAGNOSIS — J06.9 URI WITH COUGH AND CONGESTION: ICD-10-CM

## 2024-08-27 LAB
ANION GAP SERPL CALCULATED.3IONS-SCNC: 13 MMOL/L (ref 7–15)
BASOPHILS # BLD AUTO: 0.1 10E3/UL (ref 0–0.2)
BASOPHILS NFR BLD AUTO: 1 %
BUN SERPL-MCNC: 8.8 MG/DL (ref 6–20)
CALCIUM SERPL-MCNC: 8.7 MG/DL (ref 8.8–10.4)
CHLORIDE SERPL-SCNC: 103 MMOL/L (ref 98–107)
CREAT SERPL-MCNC: 0.69 MG/DL (ref 0.51–0.95)
EGFRCR SERPLBLD CKD-EPI 2021: >90 ML/MIN/1.73M2
EOSINOPHIL # BLD AUTO: 0.3 10E3/UL (ref 0–0.7)
EOSINOPHIL NFR BLD AUTO: 2 %
ERYTHROCYTE [DISTWIDTH] IN BLOOD BY AUTOMATED COUNT: 13.7 % (ref 10–15)
FLUAV RNA SPEC QL NAA+PROBE: NEGATIVE
FLUBV RNA RESP QL NAA+PROBE: NEGATIVE
GLUCOSE SERPL-MCNC: 122 MG/DL (ref 70–99)
HCO3 SERPL-SCNC: 23 MMOL/L (ref 22–29)
HCT VFR BLD AUTO: 41.8 % (ref 35–47)
HGB BLD-MCNC: 13.1 G/DL (ref 11.7–15.7)
HOLD SPECIMEN: NORMAL
HOLD SPECIMEN: NORMAL
IMM GRANULOCYTES # BLD: 0 10E3/UL
IMM GRANULOCYTES NFR BLD: 0 %
LYMPHOCYTES # BLD AUTO: 1.5 10E3/UL (ref 0.8–5.3)
LYMPHOCYTES NFR BLD AUTO: 13 %
MCH RBC QN AUTO: 25 PG (ref 26.5–33)
MCHC RBC AUTO-ENTMCNC: 31.3 G/DL (ref 31.5–36.5)
MCV RBC AUTO: 80 FL (ref 78–100)
MONOCYTES # BLD AUTO: 0.7 10E3/UL (ref 0–1.3)
MONOCYTES NFR BLD AUTO: 6 %
NEUTROPHILS # BLD AUTO: 9 10E3/UL (ref 1.6–8.3)
NEUTROPHILS NFR BLD AUTO: 78 %
NRBC # BLD AUTO: 0 10E3/UL
NRBC BLD AUTO-RTO: 0 /100
PLATELET # BLD AUTO: 264 10E3/UL (ref 150–450)
POTASSIUM SERPL-SCNC: 3.6 MMOL/L (ref 3.4–5.3)
RBC # BLD AUTO: 5.23 10E6/UL (ref 3.8–5.2)
RSV RNA SPEC NAA+PROBE: NEGATIVE
SARS-COV-2 RNA RESP QL NAA+PROBE: NEGATIVE
SODIUM SERPL-SCNC: 139 MMOL/L (ref 135–145)
WBC # BLD AUTO: 11.6 10E3/UL (ref 4–11)

## 2024-08-27 PROCEDURE — 87637 SARSCOV2&INF A&B&RSV AMP PRB: CPT | Performed by: EMERGENCY MEDICINE

## 2024-08-27 PROCEDURE — 36415 COLL VENOUS BLD VENIPUNCTURE: CPT | Performed by: EMERGENCY MEDICINE

## 2024-08-27 PROCEDURE — 85025 COMPLETE CBC W/AUTO DIFF WBC: CPT | Performed by: EMERGENCY MEDICINE

## 2024-08-27 PROCEDURE — 99283 EMERGENCY DEPT VISIT LOW MDM: CPT

## 2024-08-27 PROCEDURE — 80048 BASIC METABOLIC PNL TOTAL CA: CPT | Performed by: EMERGENCY MEDICINE

## 2024-08-27 ASSESSMENT — COLUMBIA-SUICIDE SEVERITY RATING SCALE - C-SSRS
6. HAVE YOU EVER DONE ANYTHING, STARTED TO DO ANYTHING, OR PREPARED TO DO ANYTHING TO END YOUR LIFE?: NO
1. IN THE PAST MONTH, HAVE YOU WISHED YOU WERE DEAD OR WISHED YOU COULD GO TO SLEEP AND NOT WAKE UP?: NO
2. HAVE YOU ACTUALLY HAD ANY THOUGHTS OF KILLING YOURSELF IN THE PAST MONTH?: NO

## 2024-08-27 NOTE — ED PROVIDER NOTES
"  Emergency Department Note      History of Present Illness     Chief Complaint   Cold Symptoms    HPI   Maria Alejandra Winters is a 29 year old female with a history of asthma who presents with a productive cough, congestion, and rhinorrhea which started yesterday. Today she developed body aches. No fever, sore throat, or ear pain. She adds she has had some tinnitus. No recent travel. She works as a CNA. She denies smoking or major medical problems.      Independent Historian   None    Review of External Notes   None    Past Medical History     Medical History and Problem List   Asthma  Eczema  Elevated cholesterol  Elevated blood pressure reading without diagnosis of hypertension   PCOS  Prediabetes     Medications   The patient is not currently taking any prescribed medications.    Physical Exam     Patient Vitals for the past 24 hrs:   BP Temp Temp src Pulse Resp SpO2 Height Weight   08/27/24 1742 (!) 146/80 -- -- 108 -- 98 % -- --   08/27/24 1605 (!) 151/95 98.4  F (36.9  C) Oral 112 18 96 % 1.676 m (5' 6\") 145.4 kg (320 lb 8.8 oz)     Physical Exam  Constitutional: Alert, attentive, GCS 15  HENT:    Nose: Nose normal.    Mouth/Throat: Oropharynx is mildly hyperemic, no tonsil hypertrophy or exudates  Eyes: EOM are normal, anicteric, conjugate gaze  CV: regular rate and rhythm   Chest: Effort normal and breath sounds clear without wheezing or rales, symmetric bilaterally , dry cough  GI:  non tender. No distension. No guarding or rebound.    MSK: No LE edema, no tenderness to palpation of BLE.  Neurological: Alert, attentive, moving all extremities equally.   Skin: Skin is warm and dry.    Diagnostics     Lab Results   Labs Ordered and Resulted from Time of ED Arrival to Time of ED Departure   BASIC METABOLIC PANEL - Abnormal       Result Value    Sodium 139      Potassium 3.6      Chloride 103      Carbon Dioxide (CO2) 23      Anion Gap 13      Urea Nitrogen 8.8      Creatinine 0.69      GFR Estimate >90      Calcium " 8.7 (*)     Glucose 122 (*)    CBC WITH PLATELETS AND DIFFERENTIAL - Abnormal    WBC Count 11.6 (*)     RBC Count 5.23 (*)     Hemoglobin 13.1      Hematocrit 41.8      MCV 80      MCH 25.0 (*)     MCHC 31.3 (*)     RDW 13.7      Platelet Count 264      % Neutrophils 78      % Lymphocytes 13      % Monocytes 6      % Eosinophils 2      % Basophils 1      % Immature Granulocytes 0      NRBCs per 100 WBC 0      Absolute Neutrophils 9.0 (*)     Absolute Lymphocytes 1.5      Absolute Monocytes 0.7      Absolute Eosinophils 0.3      Absolute Basophils 0.1      Absolute Immature Granulocytes 0.0      Absolute NRBCs 0.0     INFLUENZA A/B, RSV, & SARS-COV2 PCR - Normal    Influenza A PCR Negative      Influenza B PCR Negative      RSV PCR Negative      SARS CoV2 PCR Negative       Independent Interpretation   None    ED Course      Discussion of Management   None    ED Course   ED Course as of 08/27/24 1745   Tue Aug 27, 2024   1735 I obtained the patient's history and examined as noted above.      Additional Documentation  None    Medical Decision Making / Diagnosis     CMS Diagnoses: None    MIPS       None    [facility]   Maria Alejandra Winters is a 29 year old female without significant past medical history presenting for evaluation of 1 day URI symptoms with cough, congestion and bodyaches.  She does work as a CNA but no known sick contacts.  COVID testing was negative.  History is most suggestive of viral etiology.  I did review with her that COVID testing can be insensitive when done too early but either way recommended Tylenol, ibuprofen, over-the-counter medications and PCP follow-up.  Return precautions were reviewed and she was discharged    Disposition   The patient was discharged.     Diagnosis     ICD-10-CM    1. URI with cough and congestion  J06.9       2. Middle ear effusion, bilateral  H65.93          Roe Cisneros MD  [employer]  5:45 PM 08/27/24     Scribe Disclosure:  Yessenia PAYTON  Roseann, am serving as a scribe at 5:14 PM on 8/27/2024 to document services personally performed by Roe Cisneros MD based on my observations and the provider's statements to me.      Roe Cisneros MD Dunbar, John Forrest, MD  08/27/24 9291

## 2024-08-27 NOTE — LETTER
August 27, 2024      To Whom It May Concern:      Maria Alejandra Winters was seen in our Emergency Department today, 08/27/24.  I expect her condition to improve over the next 3 days.  She may return to work/school when improved.    Sincerely,      ________________  Roe Cisneros MD

## 2024-08-27 NOTE — DISCHARGE INSTRUCTIONS
I recommend altering dose of Tylenol, ibuprofen for aches and pains and fever.  For symptomatic relief, I recommend Flonase, Afrin nasal spray, Mucinex and Sudafed all of these can be picked up over-the-counter.  If you develop worsening shortness of breath, persistent fevers, you should be rechecked.  Otherwise, given suspicion for a virus I would expect your symptoms to gradually improve over about a week.

## 2024-08-27 NOTE — ED TRIAGE NOTES
Patient reports cold symptoms including body aches, chills, cough, runny nose. Patient reports occasional dizziness.      Triage Assessment (Adult)       Row Name 08/27/24 0087          Triage Assessment    Airway WDL WDL        Respiratory WDL    Respiratory WDL WDL        Skin Circulation/Temperature WDL    Skin Circulation/Temperature WDL WDL        Cardiac WDL    Cardiac WDL WDL        Peripheral/Neurovascular WDL    Peripheral Neurovascular WDL WDL        Cognitive/Neuro/Behavioral WDL    Cognitive/Neuro/Behavioral WDL WDL

## 2024-09-23 VITALS
OXYGEN SATURATION: 99 % | BODY MASS INDEX: 48.82 KG/M2 | TEMPERATURE: 99.2 F | HEIGHT: 65 IN | WEIGHT: 293 LBS | HEART RATE: 94 BPM | DIASTOLIC BLOOD PRESSURE: 99 MMHG | SYSTOLIC BLOOD PRESSURE: 145 MMHG | RESPIRATION RATE: 20 BRPM

## 2024-09-23 PROCEDURE — 99283 EMERGENCY DEPT VISIT LOW MDM: CPT

## 2024-09-24 ENCOUNTER — HOSPITAL ENCOUNTER (EMERGENCY)
Facility: CLINIC | Age: 29
Discharge: HOME | End: 2024-09-24
Attending: EMERGENCY MEDICINE | Admitting: EMERGENCY MEDICINE

## 2024-09-24 DIAGNOSIS — M54.9 ACUTE LEFT-SIDED BACK PAIN, UNSPECIFIED BACK LOCATION: ICD-10-CM

## 2024-09-24 PROCEDURE — 250N000013 HC RX MED GY IP 250 OP 250 PS 637: Performed by: STUDENT IN AN ORGANIZED HEALTH CARE EDUCATION/TRAINING PROGRAM

## 2024-09-24 RX ORDER — IBUPROFEN 600 MG/1
600 TABLET, FILM COATED ORAL ONCE
Status: COMPLETED | OUTPATIENT
Start: 2024-09-24 | End: 2024-09-24

## 2024-09-24 RX ADMIN — IBUPROFEN 600 MG: 600 TABLET, FILM COATED ORAL at 01:22

## 2024-09-24 ASSESSMENT — ACTIVITIES OF DAILY LIVING (ADL): ADLS_ACUITY_SCORE: 33

## 2024-09-24 NOTE — ED PROVIDER NOTES
"  Emergency Department Note      History of Present Illness     Chief Complaint  Back Injury    HPI  Maria Alejandra Winters is a 29 year old female who presents to the ED for evaluation of back injury.  2 days ago, the patient reports she was lifting a case of beer from the fridge at the liquor store.  The edge of the box ripped, causing a jolt to her back, and a \"pop \"of her lower back.  Patient since that time has noted pain primarily to the left side of her lower and mid back, that sometimes radiates superiorly as a more burning sensation.  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  She sometimes notes radiation of her low back pain to the proximal thigh though denies any radiation down her leg below the knee.  She denies any loss of control of bowel or bladder function.  She denies abdominal pain.  She denies any possibility of pregnancy.  She is also noted and intermittent tingling sensation down her left arm and a line going towards her thumb.  She denies any prior history of spine surgeries.  No facial symptoms.  No prior history of CVA or stroke.    Independent Historian  None    Review of External Notes  None  Past Medical History   Medical History and Problem List  Asthma  Eczema  PCOS (polycystic ovarian syndrome)    Medications  The patient is not currently taking any prescribed medications.    Surgical History   Bronchoscopy with lavage pulmonary  Physical Exam   Patient Vitals for the past 24 hrs:   BP Temp Temp src Pulse Resp SpO2 Height Weight   09/23/24 2327 (!) 145/99 99.2  F (37.3  C) Temporal 94 20 99 % 1.651 m (5' 5\") 137.9 kg (304 lb)     Physical Exam  General:              Well-nourished              Speaking in full sentences  Eyes:              Conjunctiva without injection or scleral icterus  ENT:              Moist mucous membranes              Nares patent              Pinnae normal  Neck:              Full " ROM              No stiffness appreciated  Resp:              Lungs CTAB              No crackles, wheezing or audible rubs              Good air movement  CV:                    Normal rate, regular rhythm              S1 and S2 present              No murmur, gallop or rub  GI:              BS present              Abdomen soft without distention              Non-tender to light and deep palpation              No guarding or rebound tenderness  Skin:              Warm, dry, well perfused              No rashes or open wounds on exposed skin  MSK:              Moves all extremities              No focal deformities or swelling  Neuro:              Alert   CN III-XII intact              5/5  strength, elbow flexion/extension bilaterally, thumbs up, OK sign, finger abduction/adduction              5/5 hip flexion, knee flexion/extension, ankle dorsi/plantarflexion              SILT in BUE and BLE              No clonus at the ankles              2+ radial pulse              Answers questions appropriately              Moves all extremities equally              Gait stable  Psych:              Normal affect, normal mood    Diagnostics     ED Course    Medications Administered  Medications   ibuprofen (ADVIL/MOTRIN) tablet 600 mg (600 mg Oral $Given 9/24/24 0122)     Procedures  Procedures     Discussion of Management  None    Optional/Additional Documentation  None    ED Course  ED Course as of 09/24/24 1209   Tue Sep 24, 2024   0235 I obtained history and examined the patient as noted above.    0245 I prepared the patient to be discharged home.      Medical Decision Making / Diagnosis   CMS Diagnoses: None    MIPS     None    MDM  Maria Alejandra Winters is a very pleasant 29-year-old female presenting to the emergency department for evaluation of a back injury.  VS on presentation reveal elevated BP though otherwise are unremarkable.  History, exam, and ED course as noted above.  At the present time, I am most suspicious  patient symptoms are likely multifactorial with a large component of musculoligamentous strain versus radiculopathy.  On exam, she demonstrates intact motor and sensory function as well as intact reflexes to her upper and lower extremities.  She denies any loss of control of bowel or bladder function, bilateral leg symptoms or other features that would raise concern for cauda equina or acute cord compression.  She has no constitutional symptoms of fevers, chills, and given clear mechanism of injury, low suspicion for infectious pathology such as epidural abscess, osteomyelitis, nor discitis.  I also considered CNS pathology given her upper and lower extremity involvement.  Clinically I have low suspicion for this as well as patient described a very linear pattern of tingling from the shoulder distally tracking directly to the thumb and is not circumferential.  This distribution raises suspicion for C6 nerve involvement.  Clinical impression discussed with patient.  We discussed the above differential diagnosis.  Again I have low suspicion for cerebral ischemia as a cause for her symptoms given her prominent positive symptoms of pain and mechanism of injury.  I did offer her MRI of the brain to exclude CNS lesion the patient is in agreement this unlikely represents cerebral ischemia wishes to defer this.  I do feel this to be reasonable.  We discussed a multimodal approach to pain control including alternating anti-inflammatory medications.  I have also suggested trial of topical lidocaine patches with instructions to leave in place for 12 hours, then remove for 12 hours, and not apply heat while patches in place.  We discussed the steroid medication as well and patient electing to forego this as well.  She is to return to the ER should she develop any new or troubling symptoms such as extremity weakness, progression of numbness, worsening pain, or any other concerns.  She otherwise is to follow-up with her primary  care provider.  A note was drafted for patient's employer to allow lifting restrictions while she is currently healing from her injury.  Questions answered prior to discharge.    Disposition  The patient was discharged.     ICD-10 Codes:    ICD-10-CM    1. Acute left-sided back pain, unspecified back location  M54.9            Scribe Disclosure:  Ne PAYTON, am serving as a scribe at 2:54 AM on 9/24/2024 to document services personally performed by Deonte Hayes MD based on my observations and the provider's statements to me.      Deonte Hayes MD  09/24/24 1212

## 2024-09-24 NOTE — Clinical Note
Maria Alejandra Winters was seen and treated in our emergency department on 9/23/2024.  She may return to work on 09/26/2024.  Accommodate light duty and no heavy lifting more than 5 lbs until symptoms improve     If you have any questions or concerns, please don't hesitate to call.      Deonte Hayes MD

## 2024-09-24 NOTE — ED TRIAGE NOTES
Pt reports she lifted a box yesterday and felt something pop in her back and began having lower back pain immediately.   She presents today with c/o lower back pain that is radiating up the left side with a burning sensation     Triage Assessment (Adult)       Row Name 09/23/24 6315          Triage Assessment    Airway WDL WDL        Respiratory WDL    Respiratory WDL WDL        Skin Circulation/Temperature WDL    Skin Circulation/Temperature WDL WDL        Cardiac WDL    Cardiac WDL WDL        Peripheral/Neurovascular WDL    Peripheral Neurovascular WDL WDL        Cognitive/Neuro/Behavioral WDL    Cognitive/Neuro/Behavioral WDL WDL

## 2024-09-24 NOTE — DISCHARGE INSTRUCTIONS
Please monitor symptoms closely    Tylenol / ibuprofen for pain    You may try topical lidocaine patches (leave in place for 12 hours, then remove for 12 hours, no heat while patch is in place)    Follow-up with primary care provider in 2-3 days for re-check.    Return to UR/ER with any other new or worsening symptoms.    Discharge Instructions  Back Pain  You were seen today for back pain. Back pain can have many causes, but most will get better without surgery or other specific treatment. Sometimes there is a herniated ( slipped ) disc. We do not usually do MRI scans to look for these right away, since most herniated discs will get better on their own with time.  Today, we did not find any evidence that your back pain was caused by a serious condition. However, sometimes symptoms develop over time and cannot be found during an emergency visit, so it is very important that you follow up with your primary provider.  Generally, every Emergency Department visit should have a follow-up clinic visit with either a primary or a specialty clinic/provider. Please follow-up as instructed by your emergency provider today.    Return to the Emergency Department if:  You develop a fever with your back pain.   You have weakness or change in sensation in one or both legs.  You lose control of your bowels or bladder, or cannot empty your bladder (cannot pee).  Your pain gets much worse.     Follow-up with your provider:  Unless your pain has completely gone away, please make an appointment with your provider within one week. Most of the routine care for back pain is available in a clinic and not the Emergency Department. You may need further management of your back pain, such as more pain medication, imaging such as an X-ray or MRI, or physical therapy.    What can I do to help myself?  Remain Active -- People are often afraid that they will hurt their back further or delay recovery by remaining active, but this is one of the best  things you can do for your back. In fact, staying in bed for a long time to rest is not recommended. Studies have shown that people with low back pain recover faster when they remain active. Movement helps to bring blood flow to the muscles and relieve muscle spasms as well as preventing loss of muscle strength.  Heat -- Using a heating pad can help with low back pain during the first few weeks. Do not sleep with a heating pad, as you can be burned.   Pain medications - You may take a pain medication such as Tylenol  (acetaminophen), Advil , Motrin  (ibuprofen) or Aleve  (naproxen).  If you were given a prescription for medicine here today, be sure to read all of the information (including the package insert) that comes with your prescription.  This will include important information about the medicine, its side effects, and any warnings that you need to know about.  The pharmacist who fills the prescription can provide more information and answer questions you may have about the medicine.  If you have questions or concerns that the pharmacist cannot address, please call or return to the Emergency Department.   Remember that you can always come back to the Emergency Department if you are not able to see your regular provider in the amount of time listed above, if you get any new symptoms, or if there is anything that worries you.

## 2024-12-30 ENCOUNTER — HOSPITAL ENCOUNTER (EMERGENCY)
Facility: CLINIC | Age: 29
Discharge: HOME | End: 2024-12-31
Attending: EMERGENCY MEDICINE
Payer: MEDICAID

## 2024-12-30 DIAGNOSIS — R51.9 ACUTE NONINTRACTABLE HEADACHE, UNSPECIFIED HEADACHE TYPE: ICD-10-CM

## 2024-12-30 PROCEDURE — 250N000011 HC RX IP 250 OP 636: Performed by: EMERGENCY MEDICINE

## 2024-12-30 PROCEDURE — 85025 COMPLETE CBC W/AUTO DIFF WBC: CPT | Performed by: EMERGENCY MEDICINE

## 2024-12-30 PROCEDURE — 99285 EMERGENCY DEPT VISIT HI MDM: CPT | Mod: 25

## 2024-12-30 PROCEDURE — 36415 COLL VENOUS BLD VENIPUNCTURE: CPT | Performed by: EMERGENCY MEDICINE

## 2024-12-30 PROCEDURE — 96375 TX/PRO/DX INJ NEW DRUG ADDON: CPT

## 2024-12-30 PROCEDURE — 250N000013 HC RX MED GY IP 250 OP 250 PS 637: Performed by: EMERGENCY MEDICINE

## 2024-12-30 PROCEDURE — 80048 BASIC METABOLIC PNL TOTAL CA: CPT | Performed by: EMERGENCY MEDICINE

## 2024-12-30 PROCEDURE — 96374 THER/PROPH/DIAG INJ IV PUSH: CPT | Mod: 59

## 2024-12-30 RX ORDER — IBUPROFEN 800 MG/1
800 TABLET, FILM COATED ORAL ONCE
Status: COMPLETED | OUTPATIENT
Start: 2024-12-30 | End: 2024-12-30

## 2024-12-30 RX ORDER — DEXAMETHASONE SODIUM PHOSPHATE 10 MG/ML
10 INJECTION, SOLUTION INTRAMUSCULAR; INTRAVENOUS ONCE
Status: COMPLETED | OUTPATIENT
Start: 2024-12-30 | End: 2024-12-30

## 2024-12-30 RX ORDER — ACETAMINOPHEN 500 MG
1000 TABLET ORAL ONCE
Status: COMPLETED | OUTPATIENT
Start: 2024-12-30 | End: 2024-12-30

## 2024-12-30 RX ORDER — METOCLOPRAMIDE HYDROCHLORIDE 5 MG/ML
10 INJECTION INTRAMUSCULAR; INTRAVENOUS ONCE
Status: COMPLETED | OUTPATIENT
Start: 2024-12-30 | End: 2024-12-31

## 2024-12-30 RX ORDER — DIPHENHYDRAMINE HYDROCHLORIDE 50 MG/ML
25 INJECTION INTRAMUSCULAR; INTRAVENOUS ONCE
Status: COMPLETED | OUTPATIENT
Start: 2024-12-30 | End: 2024-12-30

## 2024-12-30 RX ADMIN — METOCLOPRAMIDE 10 MG: 5 INJECTION, SOLUTION INTRAMUSCULAR; INTRAVENOUS at 23:59

## 2024-12-30 RX ADMIN — IBUPROFEN 800 MG: 800 TABLET, FILM COATED ORAL at 20:09

## 2024-12-30 RX ADMIN — ACETAMINOPHEN 1000 MG: 500 TABLET, FILM COATED ORAL at 20:09

## 2024-12-30 RX ADMIN — DIPHENHYDRAMINE HYDROCHLORIDE 25 MG: 50 INJECTION, SOLUTION INTRAMUSCULAR; INTRAVENOUS at 23:55

## 2024-12-30 RX ADMIN — DEXAMETHASONE SODIUM PHOSPHATE 10 MG: 10 INJECTION, SOLUTION INTRAMUSCULAR; INTRAVENOUS at 23:56

## 2024-12-30 ASSESSMENT — COLUMBIA-SUICIDE SEVERITY RATING SCALE - C-SSRS
1. IN THE PAST MONTH, HAVE YOU WISHED YOU WERE DEAD OR WISHED YOU COULD GO TO SLEEP AND NOT WAKE UP?: NO
2. HAVE YOU ACTUALLY HAD ANY THOUGHTS OF KILLING YOURSELF IN THE PAST MONTH?: NO
6. HAVE YOU EVER DONE ANYTHING, STARTED TO DO ANYTHING, OR PREPARED TO DO ANYTHING TO END YOUR LIFE?: NO

## 2024-12-30 ASSESSMENT — ACTIVITIES OF DAILY LIVING (ADL): ADLS_ACUITY_SCORE: 41

## 2024-12-30 NOTE — Clinical Note
Maria Alejandra Winters was seen and treated in our emergency department on 12/30/2024.  She may return to work on 01/01/2025.       If you have any questions or concerns, please don't hesitate to call.      Ozzy Luciano, DO

## 2024-12-31 ENCOUNTER — APPOINTMENT (OUTPATIENT)
Dept: CT IMAGING | Facility: CLINIC | Age: 29
End: 2024-12-31
Attending: EMERGENCY MEDICINE
Payer: MEDICAID

## 2024-12-31 VITALS
HEIGHT: 65 IN | HEART RATE: 82 BPM | TEMPERATURE: 98.4 F | OXYGEN SATURATION: 99 % | DIASTOLIC BLOOD PRESSURE: 63 MMHG | WEIGHT: 293 LBS | SYSTOLIC BLOOD PRESSURE: 118 MMHG | RESPIRATION RATE: 16 BRPM | BODY MASS INDEX: 48.82 KG/M2

## 2024-12-31 LAB
ANION GAP SERPL CALCULATED.3IONS-SCNC: 11 MMOL/L (ref 7–15)
BASOPHILS # BLD AUTO: 0.1 10E3/UL (ref 0–0.2)
BASOPHILS NFR BLD AUTO: 1 %
BUN SERPL-MCNC: 10.4 MG/DL (ref 6–20)
CALCIUM SERPL-MCNC: 8.6 MG/DL (ref 8.8–10.4)
CHLORIDE SERPL-SCNC: 105 MMOL/L (ref 98–107)
CREAT SERPL-MCNC: 0.8 MG/DL (ref 0.51–0.95)
EGFRCR SERPLBLD CKD-EPI 2021: >90 ML/MIN/1.73M2
EOSINOPHIL # BLD AUTO: 0.2 10E3/UL (ref 0–0.7)
EOSINOPHIL NFR BLD AUTO: 2 %
ERYTHROCYTE [DISTWIDTH] IN BLOOD BY AUTOMATED COUNT: 14 % (ref 10–15)
GLUCOSE SERPL-MCNC: 84 MG/DL (ref 70–99)
HCO3 SERPL-SCNC: 24 MMOL/L (ref 22–29)
HCT VFR BLD AUTO: 40.7 % (ref 35–47)
HGB BLD-MCNC: 12.9 G/DL (ref 11.7–15.7)
IMM GRANULOCYTES # BLD: 0 10E3/UL
IMM GRANULOCYTES NFR BLD: 0 %
LYMPHOCYTES # BLD AUTO: 3.1 10E3/UL (ref 0.8–5.3)
LYMPHOCYTES NFR BLD AUTO: 27 %
MCH RBC QN AUTO: 25.5 PG (ref 26.5–33)
MCHC RBC AUTO-ENTMCNC: 31.7 G/DL (ref 31.5–36.5)
MCV RBC AUTO: 80 FL (ref 78–100)
MONOCYTES # BLD AUTO: 1 10E3/UL (ref 0–1.3)
MONOCYTES NFR BLD AUTO: 8 %
NEUTROPHILS # BLD AUTO: 7.3 10E3/UL (ref 1.6–8.3)
NEUTROPHILS NFR BLD AUTO: 62 %
NRBC # BLD AUTO: 0 10E3/UL
NRBC BLD AUTO-RTO: 0 /100
PLATELET # BLD AUTO: 280 10E3/UL (ref 150–450)
POTASSIUM SERPL-SCNC: 3.9 MMOL/L (ref 3.4–5.3)
RBC # BLD AUTO: 5.06 10E6/UL (ref 3.8–5.2)
SODIUM SERPL-SCNC: 140 MMOL/L (ref 135–145)
WBC # BLD AUTO: 11.7 10E3/UL (ref 4–11)

## 2024-12-31 PROCEDURE — 250N000011 HC RX IP 250 OP 636: Performed by: EMERGENCY MEDICINE

## 2024-12-31 PROCEDURE — 250N000009 HC RX 250: Performed by: EMERGENCY MEDICINE

## 2024-12-31 PROCEDURE — 70450 CT HEAD/BRAIN W/O DYE: CPT

## 2024-12-31 PROCEDURE — 70496 CT ANGIOGRAPHY HEAD: CPT

## 2024-12-31 RX ORDER — IOPAMIDOL 755 MG/ML
67 INJECTION, SOLUTION INTRAVASCULAR ONCE
Status: COMPLETED | OUTPATIENT
Start: 2024-12-31 | End: 2024-12-31

## 2024-12-31 RX ADMIN — SODIUM CHLORIDE 80 ML: 9 INJECTION, SOLUTION INTRAVENOUS at 00:21

## 2024-12-31 RX ADMIN — IOPAMIDOL 67 ML: 755 INJECTION, SOLUTION INTRAVENOUS at 00:21

## 2024-12-31 ASSESSMENT — ACTIVITIES OF DAILY LIVING (ADL): ADLS_ACUITY_SCORE: 41

## 2024-12-31 NOTE — DISCHARGE INSTRUCTIONS
What do you do next:   Continue your home medications unless we have specifically changed them  If medications were prescribed today, take these as directed.  You can use over-the-counter acetaminophen (Tylenol ) and ibuprofen for fever or pain control as applicable to your visit today.  Acetaminophen (Tylenol): Take 500 to 1000 mg by mouth every 6 hours as needed for fever or pain.  Do not take more than 4000 total milligrams of acetaminophen-containing products in a 24-hour timeframe.  Ibuprofen: Take 600 milligrams by mouth every 6-8 hours as needed for fever or pain.  Take this with food or milk to avoid stomach upset.  You can also use over the counter Excedrin Migraine (this contains caffeine which can also help treat headaches)  Follow up as indicated below    When do you return: Review your discharge papers for specifics on reasons to return.    Thank you for allowing us to care for you today.

## 2024-12-31 NOTE — ED PROVIDER NOTES
"Emergency Department Note      Code Status: No Order    History of Present Illness     Chief Complaint:  Headache       HPI   Maria Alejandra Winters is a 29 year old female Who presents with headache.  The patient states she does not have an official diagnosis of migraines but notes that over the last week she has had frequent recurrent headaches.  She states the pain starts in the posterior neck and moved bilaterally across the top of her head to her forehead.  The patient tried acetaminophen at home without relief.  She states that in the past when she is gotten these headaches she typically just tries to sleep in a dark room and they usually go away.  She denies any nausea or vomiting.    Independent Historian:    None    Review of External Notes  None    Past Medical History   Medical History, Surgical History, Problem List, and Medications  Reviewed in Epic    Physical Exam   Patient Vitals for the past 24 hrs:   BP Temp Temp src Pulse Resp SpO2 Height Weight   12/30/24 2004 131/84 -- -- 96 18 98 % -- --   12/30/24 2003 -- 98.1  F (36.7  C) Oral -- -- -- -- --   12/30/24 2002 -- -- -- -- -- -- 1.651 m (5' 5\") 146.8 kg (323 lb 10.2 oz)       Physical Exam  Constitutional: Vital signs reviewed as above.   Eyes: PEERL, EOMI B/L  Neck: No JVD noted. FROM   Cardiovascular: normal rate, Regular rhythm and normal heart sounds.  No murmur heard. Equal B/L peripheral pulses.  Pulmonary/Chest: Effort normal and breath sounds normal. No respiratory distress. Patient has no wheezes. Patient has no rales.   Gastrointestinal: Soft. There is no tenderness.   Musculoskeletal/Extremities: No deformities noted. Normal tone.  Skin: Skin is warm and dry. There is no diaphoresis noted.   Psychiatric: The patient appears calm.   Neurological:    No nystagmus noted  Patient is alert and oriented to person, place, and time.    Speech is fluent, cognition is normal.   CN 2-12 intact (PERRL, EOMI, symmetric smile, equal eye squeeze and " forehead raise, normal and equal sensation to bilateral forehead/cheek/chin, grossly equal hearing B/L, midline tongue protrusion with nl side-to-side movement, normal shoulder shrug).    RUE strength 5/5: , finger abd, wrist flex/ext, elbow flex/ext.    LUE strength 5/5: , finger abd, wrist flex/ext, elbow flex/ext.    RLE strength 5/5: ankle flex/ext, knee flex/ext, hip flex.    LLE strength 5/5: ankle flex/ext, knee flex/ext, hip flex.    Sensation equal in all 4 extremities.    No arm drift.     Cerebellar: Normal rapid alternating movements     ( finger-nose-finger, rapid pronation/supination, hand rolling)    Normal heel-to-shin      Diagnostics     Laboratory: Imaging:   Labs Ordered and Resulted from Time of ED Arrival to Time of ED Departure   BASIC METABOLIC PANEL - Abnormal       Result Value    Sodium 140      Potassium 3.9      Chloride 105      Carbon Dioxide (CO2) 24      Anion Gap 11      Urea Nitrogen 10.4      Creatinine 0.80      GFR Estimate >90      Calcium 8.6 (*)     Glucose 84     CBC WITH PLATELETS AND DIFFERENTIAL - Abnormal    WBC Count 11.7 (*)     RBC Count 5.06      Hemoglobin 12.9      Hematocrit 40.7      MCV 80      MCH 25.5 (*)     MCHC 31.7      RDW 14.0      Platelet Count 280      % Neutrophils 62      % Lymphocytes 27      % Monocytes 8      % Eosinophils 2      % Basophils 1      % Immature Granulocytes 0      NRBCs per 100 WBC 0      Absolute Neutrophils 7.3      Absolute Lymphocytes 3.1      Absolute Monocytes 1.0      Absolute Eosinophils 0.2      Absolute Basophils 0.1      Absolute Immature Granulocytes 0.0      Absolute NRBCs 0.0       CTA Head Neck with Contrast   Final Result   IMPRESSION:    HEAD CT:   Normal head CT.      HEAD CTA:   Normal CTA [place].      NECK CTA:   Normal neck CTA.         Head CT w/o contrast   Final Result   IMPRESSION:    HEAD CT:   Normal head CT.      HEAD CTA:   Normal CTA [place].      NECK CTA:   Normal neck CTA.                  Independent Interpretation  See ED course    ED Course    Medications Administered  Medications   acetaminophen (TYLENOL) tablet 1,000 mg (1,000 mg Oral $Given 12/30/24 2009)   ibuprofen (ADVIL/MOTRIN) tablet 800 mg (800 mg Oral $Given 12/30/24 2009)   metoclopramide (REGLAN) injection 10 mg (10 mg Intravenous $Given 12/30/24 2359)   diphenhydrAMINE (BENADRYL) injection 25 mg (25 mg Intravenous $Given 12/30/24 2355)   dexAMETHasone PF (DECADRON) injection 10 mg (10 mg Intravenous $Given 12/30/24 2356)   iopamidol (ISOVUE-370) solution 67 mL (67 mLs Intravenous $Given 12/31/24 0021)   sodium chloride 0.9 % bag 500mL for CT scan flush use (80 mLs Intravenous $Given 12/31/24 0021)       Procedures  Procedures     Discussion of Management  See ED Course    ED Course  ED Course as of 12/31/24 0145   Tue Dec 31, 2024   0135 Rechecked and updated.       Optional/Additional Documentation: None    Medical Decision Making / Diagnosis     MIPS     None    Medical Decision Making:  Maria Alejandra Winters is a 29 year old female presented with a headache.  Evaluation in the emergency department, fortunately has been negative. The patient has not had any fever or neck stiffness so I doubt meningitis. There is no associated numbness, paresthesia or confusion and I doubt stroke or CNS tumor. The patient was treated symptomatically and pain has improved with medication interventions. The patient received neuroimaging that did not demonstrate a mass, aneurysm, or stroke. As the patient has been improving and in light of the negative workup, discharge to home is reasonable. The patient should follow-up with primary care. If the headache continues or the frequency increases, consultation with neurology will be indicated.  Anticipatory guidance given prior to discharge.     Critical Care:  None.    Disposition:  See ED Course and MDM    ICD-10 Codes:    ICD-10-CM    1. Acute nonintractable headache, unspecified headache type   R51.9            Discharge Medications:  New Prescriptions    No medications on file        12/30/2024   Ozzy Luciano DO     [employer]                    Ozzy Luciano DO  12/31/24 0146

## 2024-12-31 NOTE — ED TRIAGE NOTES
Pt. Presents to ED with complaints of a migraine every day for the last week. Reports the pain starts in her posterior neck and goes to her forehead across the top of her head. Pt. Reports taking her home migraine meds(acetaminophen) without relief. Last dose was around noon today. Denies all other symptoms.

## 2024-12-31 NOTE — ED NOTES
Pt discharged home by writer. Pt looks well. Pt PIV removed.   Pt given AVS and MD note for work.  Pt left amb with good gait to exit. Agreeable to plan ofcare.

## 2025-04-13 ENCOUNTER — HOSPITAL ENCOUNTER (EMERGENCY)
Facility: CLINIC | Age: 30
Discharge: HOME | End: 2025-04-13
Attending: EMERGENCY MEDICINE | Admitting: EMERGENCY MEDICINE

## 2025-04-13 ENCOUNTER — APPOINTMENT (OUTPATIENT)
Dept: GENERAL RADIOLOGY | Facility: CLINIC | Age: 30
End: 2025-04-13
Attending: EMERGENCY MEDICINE

## 2025-04-13 VITALS
RESPIRATION RATE: 20 BRPM | SYSTOLIC BLOOD PRESSURE: 140 MMHG | TEMPERATURE: 98.7 F | BODY MASS INDEX: 53.71 KG/M2 | DIASTOLIC BLOOD PRESSURE: 76 MMHG | OXYGEN SATURATION: 98 % | WEIGHT: 293 LBS | HEART RATE: 78 BPM

## 2025-04-13 DIAGNOSIS — S93.401A SPRAIN OF RIGHT ANKLE, UNSPECIFIED LIGAMENT, INITIAL ENCOUNTER: ICD-10-CM

## 2025-04-13 DIAGNOSIS — S82.891A AVULSION FRACTURE OF ANKLE, RIGHT, CLOSED, INITIAL ENCOUNTER: ICD-10-CM

## 2025-04-13 PROCEDURE — 73610 X-RAY EXAM OF ANKLE: CPT | Mod: RT

## 2025-04-13 PROCEDURE — 99284 EMERGENCY DEPT VISIT MOD MDM: CPT

## 2025-04-13 PROCEDURE — 250N000013 HC RX MED GY IP 250 OP 250 PS 637: Performed by: EMERGENCY MEDICINE

## 2025-04-13 RX ORDER — IBUPROFEN 600 MG/1
600 TABLET, FILM COATED ORAL ONCE
Status: COMPLETED | OUTPATIENT
Start: 2025-04-13 | End: 2025-04-13

## 2025-04-13 RX ORDER — ACETAMINOPHEN 500 MG
1000 TABLET ORAL ONCE
Status: COMPLETED | OUTPATIENT
Start: 2025-04-13 | End: 2025-04-13

## 2025-04-13 RX ADMIN — ACETAMINOPHEN 1000 MG: 500 TABLET ORAL at 16:22

## 2025-04-13 RX ADMIN — IBUPROFEN 600 MG: 600 TABLET, FILM COATED ORAL at 16:23

## 2025-04-13 ASSESSMENT — COLUMBIA-SUICIDE SEVERITY RATING SCALE - C-SSRS
1. IN THE PAST MONTH, HAVE YOU WISHED YOU WERE DEAD OR WISHED YOU COULD GO TO SLEEP AND NOT WAKE UP?: NO
6. HAVE YOU EVER DONE ANYTHING, STARTED TO DO ANYTHING, OR PREPARED TO DO ANYTHING TO END YOUR LIFE?: NO
2. HAVE YOU ACTUALLY HAD ANY THOUGHTS OF KILLING YOURSELF IN THE PAST MONTH?: NO

## 2025-04-13 ASSESSMENT — ACTIVITIES OF DAILY LIVING (ADL)
ADLS_ACUITY_SCORE: 41
ADLS_ACUITY_SCORE: 41

## 2025-04-13 NOTE — DISCHARGE INSTRUCTIONS
Okay to continue with Tylenol 1000 mg every 6 hours as needed.  Also okay to take ibuprofen 600 mg every 6 hours as needed for pain.  Please rest, ice, and elevate the right lower leg until follow-up with orthopedics.

## 2025-04-13 NOTE — LETTER
April 13, 2025      To Whom It May Concern:      Maria Alejandra Winters was seen in our Emergency Department today, 04/13/25.  I expect her condition to improve over the next 1-3 days.  She may return to work when improved.    Sincerely,        Shaylee BOLAND RN

## 2025-04-13 NOTE — ED TRIAGE NOTES
Patient states she was walking down the steps, slipped and fell onto her right foot/ankle. States she heard some cracks. Still bearing weight on leg. But pain increasing. This is the 3rd time she has fallen onto this foot. ABCs intact. VSS.

## 2025-04-13 NOTE — ED PROVIDER NOTES
Emergency Department Note      History of Present Illness     Chief Complaint   Ankle Pain    HPI   Maria Alejandra Winters is a 29 year old female who presents to the ED with her mother for evaluation of right ankle pain. Maria Alejandra reports that as she was headed to work this afternoon she rolled her ankle and fell down her stairs. When her ankle rolled she heard a crack, developed lateral ankle pain, and right knee pain. Maria Alejandra's foot rolled outward. She did not hit her head. This is the third time that she's hurt her ankle in the last couple of years.     Independent Historian   None    Review of External Notes   None    Past Medical History     Medical History and Problem List   Asthma  Amenorrhea   Atypical pneumonia   Dental disease   Eczema  Hyperlipidemia   Myopia   Myopic astigmatism   Obesity   PCOS   Prediabetes   Scoliosis   Stress incontinence   Tinea capitis     Medications   Zofran     Surgical History   Bronchoscopy with lavage pulmonary     Physical Exam     Patient Vitals for the past 24 hrs:   BP Temp Temp src Pulse Resp SpO2 Weight   04/13/25 1717 (!) 140/76 -- -- 78 20 98 % --   04/13/25 1513 (!) 141/83 98.7  F (37.1  C) Temporal 89 18 96 % (!) 146.4 kg (322 lb 12.1 oz)     Physical Exam  General: Resting on the gurney  Head:  The scalp, face, and head appear normal  Eyes:  The pupils are normal    Conjunctivae and sclera appear normal  ENT:    The nose is normal    Ears/pinnae are normal  MS:  Right Leg:    Thigh:     Normal    Knee:     Normal; there is no effusion    Proximal fibula:   Normal and non-tender    Tibia:     Long shaft of the tibia is normal    Suzie-lateral malleolar ligaments: Swollen and tender    Medial collateral (Deltoid) ligament: Normal    Lateral malleolus:   Mildly-tender    Medial Malleolus:   Non-tender      Achilles tendon:   Normal, intact    5th MT base:    Normal and non-tender    Foot bones:    Normal and non-tender     Capillary Refill:   Normal.    Sensation:    Foot  and ankle sensation are normal  Skin:  No rash or lesions noted, swelling is notes as above  Neuro:  Speech is normal and fluent  Psych: Awake. Alert.  Normal affect.  Appropriate interactions.    Diagnostics     Lab Results   Labs Ordered and Resulted from Time of ED Arrival to Time of ED Departure - No data to display    Imaging   Ankle XR, G/E 3 views, right   Final Result   IMPRESSION: Spurring of the distal medial and lateral malleoli which is likely sequela of remote trauma. Possible superimposed acute mildly displaced avulsion fracture of the distal tip of the lateral malleolus with subtle lucency and cortical step-off.    Soft tissue edema about the ankle. Intact ankle mortise.      NOTE: ABNORMAL REPORT      THE DICTATION ABOVE DESCRIBES AN ABNORMALITY FOR WHICH FOLLOW-UP IS NEEDED.          Independent Interpretation   X-ray right ankle shows an avulsion fracture.    ED Course      Medications Administered   Medications   ibuprofen (ADVIL/MOTRIN) tablet 600 mg (600 mg Oral $Given 4/13/25 1623)   acetaminophen (TYLENOL) tablet 1,000 mg (1,000 mg Oral $Given 4/13/25 1622)     Procedures   Procedures     Discussion of Management   None    ED Course   ED Course as of 04/13/25 1852   Sun Apr 13, 2025   1558 I obtained history and examined the patient as noted above.    1635 I updated and discharged the patient.      Additional Documentation  None    Medical Decision Making / Diagnosis     CMS Diagnoses: None    MIPS       None    [facility]   Maria Alejandra Winters is a 29 year old female who presented for an acute ankle injury.  Hx and exam today is consistent with an ankle sprain vs avulsion fracture of distal fibula.  X-ray showed possible avulsion fracture at distal fibula.  There is no hip or knee pain or tenderness to suggest proximal injury.  Given concern for avulsion fracture versus severe sprain patient was placed in a postoperative walking boot.  She was encouraged to elevate and ice the right lower  extremity/ankle.  Encouraged ibuprofen every 6 hours as needed.  Follow-up with orthopedic surgery in 1 to 2 weeks for recheck as needed.  After all questions answered return precautions understood, discharged home    Disposition   The patient was discharged.     Diagnosis     ICD-10-CM    1. Sprain of right ankle, unspecified ligament, initial encounter  S93.401A       2. Avulsion fracture of ankle, right, closed, initial encounter  S82.891A Ankle/Foot Bracing Supplies Order Walking Boot; Right; Non-pneumatic         Discharge Medications   Discharge Medication List as of 4/13/2025  5:09 PM        Elis PAYTON, am serving as a scribe at 3:48 PM on 4/13/2025 to document services personally performed by Vincent Kulkarni MD based on my observations and the provider's statements to me.        Vincent Kulkarni MD  04/13/25 3305